# Patient Record
Sex: MALE | Race: OTHER | NOT HISPANIC OR LATINO | URBAN - METROPOLITAN AREA
[De-identification: names, ages, dates, MRNs, and addresses within clinical notes are randomized per-mention and may not be internally consistent; named-entity substitution may affect disease eponyms.]

---
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
## 2021-04-07 ENCOUNTER — APPOINTMENT (OUTPATIENT)
Dept: DERMATOLOGY | Facility: CLINIC | Age: 21
End: 2021-04-07
Payer: MEDICAID

## 2021-04-07 PROCEDURE — 99072 ADDL SUPL MATRL&STAF TM PHE: CPT

## 2021-04-07 PROCEDURE — 99214 OFFICE O/P EST MOD 30 MIN: CPT

## 2021-04-07 NOTE — HISTORY OF PRESENT ILLNESS
[FreeTextEntry1] : acne follow up  [de-identified] : Patient is an 20 yr old M presenting for follow up of acne noted for 2 years on the face- cheeks, forehead; upper back and shoulders. The acne is worse when he doesn't play sports and he tried ProActiv (BP) and Clarins in the past. He was last seen May 2020.\par \par Since his last visit, he reports that he has seen improvement with acne with wash and wipes. Denies any dryness. Denies picking. Denies any irritation. He has not been using the tretinoin gel. He is using the BPO wash in the shower and clindamycin swab [facility]. \par \par also with eczema on hands.

## 2021-04-07 NOTE — PHYSICAL EXAM
[Alert] : alert [Oriented x 3] : ~L oriented x 3 [Well Nourished] : well nourished [Conjunctiva Non-injected] : conjunctiva non-injected [No Visual Lymphadenopathy] : no visual  lymphadenopathy [No Clubbing] : no clubbing [No Edema] : no edema [No Bromhidrosis] : no bromhidrosis [No Chromhidrosis] : no chromhidrosis [FreeTextEntry3] : a few open and closed comedones on face and upper shoulders, few scars\par eczematous patch on hands

## 2021-04-07 NOTE — ASSESSMENT
[FreeTextEntry1] : Comedonal and inflammatory acne on face and shoulders:\par - Use of noncomedogenic and oil free moisturizers and sunscreens reinforced\par - c/w BPO wash in the shower\par - c/w Clinda  swab QAM\par - c/w Tretinoin 0.025% gel QHS, SED (irritation, dryness, potential flare, photosensitivity) - sent to [facility] and preferred pharmacy; Use Differin gel if unable to obtain Tretinoin.\par discussed areas of scarring; consider microneedling\par \par dyshydrotic eczema\par -education\par -gentle skin care reviewed\par -betamethasone ointment BID PRN; SED\par \par

## 2021-08-30 ENCOUNTER — RX RENEWAL (OUTPATIENT)
Age: 21
End: 2021-08-30

## 2021-10-09 ENCOUNTER — APPOINTMENT (OUTPATIENT)
Dept: DERMATOLOGY | Facility: CLINIC | Age: 21
End: 2021-10-09
Payer: MEDICAID

## 2021-10-09 DIAGNOSIS — L30.1 DYSHIDROSIS [POMPHOLYX]: ICD-10-CM

## 2021-10-09 PROCEDURE — 99214 OFFICE O/P EST MOD 30 MIN: CPT

## 2021-10-09 RX ORDER — TRETINOIN 0.25 MG/G
0.03 CREAM TOPICAL
Qty: 1 | Refills: 3 | Status: DISCONTINUED | COMMUNITY
Start: 2019-10-02 | End: 2021-10-09

## 2021-10-09 RX ORDER — TRETINOIN 0.25 MG/G
0.03 GEL TOPICAL
Qty: 1 | Refills: 11 | Status: DISCONTINUED | COMMUNITY
Start: 2019-06-27 | End: 2021-10-09

## 2021-10-09 RX ORDER — DOXYCYCLINE HYCLATE 100 MG/1
100 TABLET ORAL
Qty: 180 | Refills: 0 | Status: ACTIVE | COMMUNITY
Start: 2021-10-09 | End: 1900-01-01

## 2021-10-09 RX ORDER — CLINDAMYCIN PHOSPHATE 10 MG/ML
1 SOLUTION TOPICAL TWICE DAILY
Qty: 1 | Refills: 11 | Status: DISCONTINUED | COMMUNITY
Start: 2019-06-27 | End: 2021-10-09

## 2021-12-05 ENCOUNTER — RX RENEWAL (OUTPATIENT)
Age: 21
End: 2021-12-05

## 2021-12-15 ENCOUNTER — APPOINTMENT (OUTPATIENT)
Dept: PEDIATRIC ADOLESCENT MEDICINE | Facility: HOSPITAL | Age: 21
End: 2021-12-15
Payer: MEDICAID

## 2021-12-15 ENCOUNTER — OUTPATIENT (OUTPATIENT)
Dept: OUTPATIENT SERVICES | Age: 21
LOS: 1 days | End: 2021-12-15

## 2021-12-15 VITALS
DIASTOLIC BLOOD PRESSURE: 85 MMHG | SYSTOLIC BLOOD PRESSURE: 123 MMHG | HEIGHT: 70.25 IN | HEART RATE: 90 BPM | WEIGHT: 118.39 LBS | BODY MASS INDEX: 16.95 KG/M2

## 2021-12-15 DIAGNOSIS — Z23 ENCOUNTER FOR IMMUNIZATION: ICD-10-CM

## 2021-12-15 DIAGNOSIS — R63.6 UNDERWEIGHT: ICD-10-CM

## 2021-12-15 DIAGNOSIS — Z01.00 ENCOUNTER FOR EXAMINATION OF EYES AND VISION W/OUT ABNORMAL FINDINGS: ICD-10-CM

## 2021-12-15 PROCEDURE — 99395 PREV VISIT EST AGE 18-39: CPT | Mod: 25

## 2021-12-15 PROCEDURE — 90471 IMMUNIZATION ADMIN: CPT

## 2021-12-15 PROCEDURE — 90715 TDAP VACCINE 7 YRS/> IM: CPT

## 2021-12-15 PROCEDURE — 90620 MENB-4C VACCINE IM: CPT

## 2021-12-15 PROCEDURE — 90472 IMMUNIZATION ADMIN EACH ADD: CPT

## 2021-12-20 PROBLEM — Z23 NEED FOR VACCINATION: Status: ACTIVE | Noted: 2019-06-14

## 2021-12-20 LAB
25(OH)D3 SERPL-MCNC: 15.6 NG/ML
ALBUMIN SERPL ELPH-MCNC: 5 G/DL
ALP BLD-CCNC: 76 U/L
ALT SERPL-CCNC: 11 U/L
ANION GAP SERPL CALC-SCNC: 13 MMOL/L
AST SERPL-CCNC: 21 U/L
BASOPHILS # BLD AUTO: 0.05 K/UL
BASOPHILS NFR BLD AUTO: 0.5 %
BILIRUB SERPL-MCNC: 2.2 MG/DL
BUN SERPL-MCNC: 14 MG/DL
CALCIUM SERPL-MCNC: 10.3 MG/DL
CHLORIDE SERPL-SCNC: 101 MMOL/L
CHOLEST SERPL-MCNC: 178 MG/DL
CO2 SERPL-SCNC: 26 MMOL/L
CREAT SERPL-MCNC: 1.04 MG/DL
EOSINOPHIL # BLD AUTO: 0.02 K/UL
EOSINOPHIL NFR BLD AUTO: 0.2 %
GLUCOSE SERPL-MCNC: 78 MG/DL
HCT VFR BLD CALC: 49 %
HDLC SERPL-MCNC: 52 MG/DL
HGB BLD-MCNC: 16 G/DL
IMM GRANULOCYTES NFR BLD AUTO: 0.3 %
LDLC SERPL CALC-MCNC: 115 MG/DL
LYMPHOCYTES # BLD AUTO: 2.21 K/UL
LYMPHOCYTES NFR BLD AUTO: 23.4 %
M TB IFN-G BLD-IMP: NEGATIVE
MAN DIFF?: NORMAL
MCHC RBC-ENTMCNC: 29 PG
MCHC RBC-ENTMCNC: 32.7 GM/DL
MCV RBC AUTO: 88.8 FL
MEV IGG FLD QL IA: 78.6 AU/ML
MEV IGG+IGM SER-IMP: POSITIVE
MONOCYTES # BLD AUTO: 0.58 K/UL
MONOCYTES NFR BLD AUTO: 6.1 %
NEUTROPHILS # BLD AUTO: 6.57 K/UL
NEUTROPHILS NFR BLD AUTO: 69.5 %
NONHDLC SERPL-MCNC: 126 MG/DL
PLATELET # BLD AUTO: 260 K/UL
POTASSIUM SERPL-SCNC: 4.2 MMOL/L
PROT SERPL-MCNC: 8 G/DL
QUANTIFERON TB PLUS MITOGEN MINUS NIL: 8.31 IU/ML
QUANTIFERON TB PLUS NIL: 0.06 IU/ML
QUANTIFERON TB PLUS TB1 MINUS NIL: 0 IU/ML
QUANTIFERON TB PLUS TB2 MINUS NIL: -0.01 IU/ML
RBC # BLD: 5.52 M/UL
RBC # FLD: 12.6 %
RUBV IGG FLD-ACNC: 2 INDEX
RUBV IGG SER-IMP: POSITIVE
SODIUM SERPL-SCNC: 140 MMOL/L
TRIGL SERPL-MCNC: 53 MG/DL
TSH SERPL-ACNC: 1.31 UIU/ML
WBC # FLD AUTO: 9.46 K/UL

## 2021-12-20 NOTE — PHYSICAL EXAM

## 2021-12-20 NOTE — DISCUSSION/SUMMARY
[FreeTextEntry1] : Jam is a 22yo M with history of ARFID, underweight, acne here for annual PE and work forms.  \par \par Plan:\par - Lab: CBC, CMP, lipid, TSH, FT4, vit D. rubella, rubeola and Quant TB (work)\par - Vaccine today: Bexero#2, Tdap. Patient declined Fluzone\par - Continue with Dermatology for acne, eczema and recent hair thinning \par - Refer to Urology for seminal fluid leak, R/O prostatitis \par - Work forms to be completed and email to patient once labs are final\par - FU annually for PE
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
## 2021-12-29 ENCOUNTER — APPOINTMENT (OUTPATIENT)
Dept: PEDIATRICS | Facility: HOSPITAL | Age: 21
End: 2021-12-29

## 2022-01-08 ENCOUNTER — APPOINTMENT (OUTPATIENT)
Dept: DERMATOLOGY | Facility: CLINIC | Age: 22
End: 2022-01-08

## 2022-01-12 ENCOUNTER — RX RENEWAL (OUTPATIENT)
Age: 22
End: 2022-01-12

## 2022-02-08 ENCOUNTER — NON-APPOINTMENT (OUTPATIENT)
Age: 22
End: 2022-02-08

## 2022-02-08 ENCOUNTER — APPOINTMENT (OUTPATIENT)
Dept: OPHTHALMOLOGY | Facility: CLINIC | Age: 22
End: 2022-02-08
Payer: MEDICAID

## 2022-02-08 PROCEDURE — 92004 COMPRE OPH EXAM NEW PT 1/>: CPT

## 2022-02-08 PROCEDURE — 92015 DETERMINE REFRACTIVE STATE: CPT | Mod: NC

## 2022-02-18 ENCOUNTER — APPOINTMENT (OUTPATIENT)
Dept: UROLOGY | Facility: CLINIC | Age: 22
End: 2022-02-18
Payer: MEDICAID

## 2022-02-18 VITALS
SYSTOLIC BLOOD PRESSURE: 123 MMHG | BODY MASS INDEX: 17.22 KG/M2 | WEIGHT: 123 LBS | HEIGHT: 71 IN | HEART RATE: 91 BPM | DIASTOLIC BLOOD PRESSURE: 83 MMHG

## 2022-02-18 DIAGNOSIS — Z78.9 OTHER SPECIFIED HEALTH STATUS: ICD-10-CM

## 2022-02-18 DIAGNOSIS — N50.89 OTHER SPECIFIED DISORDERS OF THE MALE GENITAL ORGANS: ICD-10-CM

## 2022-02-18 PROCEDURE — 99203 OFFICE O/P NEW LOW 30 MIN: CPT

## 2022-02-18 NOTE — PHYSICAL EXAM
[General Appearance - Well Developed] : well developed [General Appearance - Well Nourished] : well nourished [Normal Appearance] : normal appearance [Well Groomed] : well groomed [General Appearance - In No Acute Distress] : no acute distress [Edema] : no peripheral edema [Respiration, Rhythm And Depth] : normal respiratory rhythm and effort [Exaggerated Use Of Accessory Muscles For Inspiration] : no accessory muscle use [Abdomen Soft] : soft [Abdomen Tenderness] : non-tender [Costovertebral Angle Tenderness] : no ~M costovertebral angle tenderness [Urethral Meatus] : meatus normal [Penis Abnormality] : normal circumcised penis [Scrotum] : the scrotum was normal [Urinary Bladder Findings] : the bladder was normal on palpation [Epididymis] : the epididymides were normal [Testes Tenderness] : no tenderness of the testes [Testes Mass (___cm)] : there were no testicular masses [Normal Station and Gait] : the gait and station were normal for the patient's age [] : no rash [No Focal Deficits] : no focal deficits [Oriented To Time, Place, And Person] : oriented to person, place, and time [Affect] : the affect was normal [Mood] : the mood was normal [Not Anxious] : not anxious [No Palpable Adenopathy] : no palpable adenopathy

## 2022-02-21 PROBLEM — N50.89 LEAKING SEMINAL FLUID: Status: ACTIVE | Noted: 2021-12-15

## 2022-02-21 NOTE — HISTORY OF PRESENT ILLNESS
[FreeTextEntry1] : 20 yo M presents with "near constant preseminal fluid leakage" starting a few months ago\par Before, it was only when aroused but notices it even not these days\par Not a constant stream but also seems to have a few drops at the meatus requiring use of toilet paper liner\par No blood, no pain\par Also feels like erections not as rigid for the last year\par Of note, pt does not self-stimulate at all due to [employer] reasons but just noticed that when he gets spontaneous erections, not as rigid\par Sometimes nocturnal emission can cause some pain as well\par Not sexually active\par no urinary issues

## 2022-02-21 NOTE — ASSESSMENT
[FreeTextEntry1] : 22 yo M with spontaneous leakage of seminal fluid, possible ED \par \par - Reviewed potential reasons for symptoms. Reassured pt that in most cases, this is completely normal. It also often resolved with increased sexual activity\par - Reviewed potential etiologies for a weaker erection. Again, discussed likely benign and once he is sexually active, may find that erections are normal. However, if pt is very concerned, discussed the pros and cons of a penile ultrasound. Pt is not ready to proceed with one at this time.\par - UA, culture, GC given possible urethral discharge\par - FU as needed

## 2022-02-22 LAB
APPEARANCE: CLEAR
BACTERIA UR CULT: NORMAL
BACTERIA: NEGATIVE
BILIRUBIN URINE: NEGATIVE
BLOOD URINE: NORMAL
C TRACH RRNA SPEC QL NAA+PROBE: NOT DETECTED
COLOR: YELLOW
GLUCOSE QUALITATIVE U: NEGATIVE
HYALINE CASTS: 0 /LPF
KETONES URINE: NEGATIVE
LEUKOCYTE ESTERASE URINE: NEGATIVE
MICROSCOPIC-UA: NORMAL
N GONORRHOEA RRNA SPEC QL NAA+PROBE: NOT DETECTED
NITRITE URINE: NEGATIVE
PH URINE: 7
PROTEIN URINE: NORMAL
RED BLOOD CELLS URINE: 7 /HPF
SOURCE AMPLIFICATION: NORMAL
SPECIFIC GRAVITY URINE: 1.02
SQUAMOUS EPITHELIAL CELLS: 1 /HPF
UROBILINOGEN URINE: NORMAL
WHITE BLOOD CELLS URINE: 1 /HPF

## 2022-02-23 ENCOUNTER — NON-APPOINTMENT (OUTPATIENT)
Age: 22
End: 2022-02-23

## 2022-03-02 ENCOUNTER — APPOINTMENT (OUTPATIENT)
Dept: UROLOGY | Facility: CLINIC | Age: 22
End: 2022-03-02
Payer: MEDICAID

## 2022-03-02 PROCEDURE — 76705 ECHO EXAM OF ABDOMEN: CPT

## 2022-04-05 ENCOUNTER — APPOINTMENT (OUTPATIENT)
Dept: INTERNAL MEDICINE | Facility: CLINIC | Age: 22
End: 2022-04-05
Payer: MEDICAID

## 2022-04-05 VITALS
BODY MASS INDEX: 16.89 KG/M2 | HEIGHT: 70 IN | DIASTOLIC BLOOD PRESSURE: 80 MMHG | SYSTOLIC BLOOD PRESSURE: 112 MMHG | HEART RATE: 85 BPM | TEMPERATURE: 97.8 F | OXYGEN SATURATION: 99 % | WEIGHT: 118 LBS

## 2022-04-05 PROCEDURE — 99385 PREV VISIT NEW AGE 18-39: CPT | Mod: 25

## 2022-04-05 PROCEDURE — G0444 DEPRESSION SCREEN ANNUAL: CPT | Mod: 59

## 2022-04-05 RX ORDER — BETAMETHASONE DIPROPIONATE 0.5 MG/G
0.05 OINTMENT TOPICAL TWICE DAILY
Qty: 1 | Refills: 0 | Status: DISCONTINUED | COMMUNITY
Start: 2021-04-07 | End: 2022-04-05

## 2022-04-05 RX ORDER — KETOCONAZOLE 20.5 MG/ML
2 SHAMPOO, SUSPENSION TOPICAL
Qty: 1 | Refills: 1 | Status: DISCONTINUED | COMMUNITY
Start: 2020-05-13 | End: 2022-04-05

## 2022-04-05 NOTE — PHYSICAL EXAM
[No Acute Distress] : no acute distress [Well Nourished] : well nourished [Well Developed] : well developed [Normal Sclera/Conjunctiva] : normal sclera/conjunctiva [PERRL] : pupils equal round and reactive to light [EOMI] : extraocular movements intact [Normal Outer Ear/Nose] : the outer ears and nose were normal in appearance [No JVD] : no jugular venous distention [No Lymphadenopathy] : no lymphadenopathy [Supple] : supple [No Respiratory Distress] : no respiratory distress  [No Accessory Muscle Use] : no accessory muscle use [Clear to Auscultation] : lungs were clear to auscultation bilaterally [Normal Rate] : normal rate  [Regular Rhythm] : with a regular rhythm [Normal S1, S2] : normal S1 and S2 [No Edema] : there was no peripheral edema [No Extremity Clubbing/Cyanosis] : no extremity clubbing/cyanosis [Soft] : abdomen soft [Non Tender] : non-tender [Non-distended] : non-distended [Normal Bowel Sounds] : normal bowel sounds [Normal Posterior Cervical Nodes] : no posterior cervical lymphadenopathy [Normal Anterior Cervical Nodes] : no anterior cervical lymphadenopathy [No CVA Tenderness] : no CVA  tenderness [No Spinal Tenderness] : no spinal tenderness [No Joint Swelling] : no joint swelling [Grossly Normal Strength/Tone] : grossly normal strength/tone [No Rash] : no rash [Coordination Grossly Intact] : coordination grossly intact [No Focal Deficits] : no focal deficits [Normal Gait] : normal gait [Normal Affect] : the affect was normal [Normal Insight/Judgement] : insight and judgment were intact [Comprehensive Foot Exam Normal] : Right and left foot were examined and both feet are normal. No ulcers in either foot. Toes are normal and with full ROM.  Normal tactile sensation with monofilament testing throughout both feet
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
## 2022-04-05 NOTE — ASSESSMENT
[FreeTextEntry1] : 1) HM Encouraged healthy meals and snacks, and  physical activity as tolerated for weight maintenance. UTD with vaccine. Lab ordered as below  Orders and referral provided as below.\par \par All patient questions answered today and understood by patient. Patient to follow up if new symptoms, questions, renewals or health concerns.

## 2022-04-05 NOTE — HEALTH RISK ASSESSMENT
[Very Good] : ~his/her~  mood as very good [Never] : Never [No] : In the past 12 months have you used drugs other than those required for medical reasons? No [No falls in past year] : Patient reported no falls in the past year [0] : 2) Feeling down, depressed, or hopeless: Not at all (0) [PHQ-2 Negative - No further assessment needed] : PHQ-2 Negative - No further assessment needed [de-identified] : walking [de-identified] : balanced [[id number]] : 0 [None] : None [Fully functional (bathing, dressing, toileting, transferring, walking, feeding)] : Fully functional (bathing, dressing, toileting, transferring, walking, feeding) [Fully functional (using the telephone, shopping, preparing meals, housekeeping, doing laundry, using] : Fully functional and needs no help or supervision to perform IADLs (using the telephone, shopping, preparing meals, housekeeping, doing laundry, using transportation, managing medications and managing finances) [Smoke Detector] : smoke detector [Seat Belt] :  uses seat belt [Patient/Caregiver not ready to engage] : , patient/caregiver not ready to engage

## 2022-04-06 ENCOUNTER — TRANSCRIPTION ENCOUNTER (OUTPATIENT)
Age: 22
End: 2022-04-06

## 2022-04-06 ENCOUNTER — NON-APPOINTMENT (OUTPATIENT)
Age: 22
End: 2022-04-06

## 2022-04-06 LAB
ALBUMIN SERPL ELPH-MCNC: 4.9 G/DL
ALP BLD-CCNC: 71 U/L
ALT SERPL-CCNC: 107 U/L
ANION GAP SERPL CALC-SCNC: 11 MMOL/L
APPEARANCE: CLEAR
AST SERPL-CCNC: 253 U/L
BACTERIA: NEGATIVE
BASOPHILS # BLD AUTO: 0.03 K/UL
BASOPHILS NFR BLD AUTO: 0.7 %
BILIRUB SERPL-MCNC: 1.4 MG/DL
BILIRUBIN URINE: NEGATIVE
BLOOD URINE: NEGATIVE
BUN SERPL-MCNC: 14 MG/DL
CALCIUM SERPL-MCNC: 9.9 MG/DL
CHLORIDE SERPL-SCNC: 102 MMOL/L
CHOLEST SERPL-MCNC: 147 MG/DL
CK SERPL-CCNC: ABNORMAL U/L
CO2 SERPL-SCNC: 26 MMOL/L
COLOR: NORMAL
CREAT SERPL-MCNC: 0.92 MG/DL
CRP SERPL-MCNC: <3 MG/L
EGFR: 122 ML/MIN/1.73M2
EOSINOPHIL # BLD AUTO: 0.07 K/UL
EOSINOPHIL NFR BLD AUTO: 1.6 %
GLUCOSE QUALITATIVE U: NEGATIVE
GLUCOSE SERPL-MCNC: 93 MG/DL
HBV CORE IGG+IGM SER QL: NONREACTIVE
HBV SURFACE AB SERPL IA-ACNC: 156.9 MIU/ML
HBV SURFACE AG SER QL: NONREACTIVE
HCT VFR BLD CALC: 45.6 %
HCV AB SER QL: NONREACTIVE
HCV S/CO RATIO: 0.2 S/CO
HDLC SERPL-MCNC: 45 MG/DL
HGB BLD-MCNC: 15 G/DL
HYALINE CASTS: 0 /LPF
IMM GRANULOCYTES NFR BLD AUTO: 0.2 %
KETONES URINE: NEGATIVE
LDLC SERPL CALC-MCNC: 91 MG/DL
LEUKOCYTE ESTERASE URINE: NEGATIVE
LYMPHOCYTES # BLD AUTO: 2.05 K/UL
LYMPHOCYTES NFR BLD AUTO: 47.8 %
MAN DIFF?: NORMAL
MCHC RBC-ENTMCNC: 29.2 PG
MCHC RBC-ENTMCNC: 32.9 GM/DL
MCV RBC AUTO: 88.9 FL
MICROSCOPIC-UA: NORMAL
MONOCYTES # BLD AUTO: 0.33 K/UL
MONOCYTES NFR BLD AUTO: 7.7 %
NEUTROPHILS # BLD AUTO: 1.8 K/UL
NEUTROPHILS NFR BLD AUTO: 42 %
NITRITE URINE: NEGATIVE
NONHDLC SERPL-MCNC: 102 MG/DL
PH URINE: 6.5
PLATELET # BLD AUTO: 240 K/UL
POTASSIUM SERPL-SCNC: 4.8 MMOL/L
PROT SERPL-MCNC: 7.5 G/DL
PROTEIN URINE: NEGATIVE
RBC # BLD: 5.13 M/UL
RBC # FLD: 12.3 %
RED BLOOD CELLS URINE: 1 /HPF
SODIUM SERPL-SCNC: 139 MMOL/L
SPECIFIC GRAVITY URINE: 1.02
SQUAMOUS EPITHELIAL CELLS: 0 /HPF
TRIGL SERPL-MCNC: 52 MG/DL
TSH SERPL-ACNC: 1.2 UIU/ML
UROBILINOGEN URINE: NORMAL
VIT B12 SERPL-MCNC: 589 PG/ML
WBC # FLD AUTO: 4.29 K/UL
WHITE BLOOD CELLS URINE: 0 /HPF

## 2022-04-07 ENCOUNTER — NON-APPOINTMENT (OUTPATIENT)
Age: 22
End: 2022-04-07

## 2022-04-07 ENCOUNTER — INPATIENT (INPATIENT)
Facility: HOSPITAL | Age: 22
LOS: 0 days | Discharge: HOME | DRG: 558 | End: 2022-04-08
Attending: HOSPITALIST | Admitting: HOSPITALIST
Payer: MEDICAID

## 2022-04-07 VITALS
WEIGHT: 119.93 LBS | RESPIRATION RATE: 20 BRPM | SYSTOLIC BLOOD PRESSURE: 127 MMHG | OXYGEN SATURATION: 98 % | HEIGHT: 70 IN | DIASTOLIC BLOOD PRESSURE: 80 MMHG | HEART RATE: 83 BPM | TEMPERATURE: 98 F

## 2022-04-07 DIAGNOSIS — R74.8 ABNORMAL LEVELS OF OTHER SERUM ENZYMES: ICD-10-CM

## 2022-04-07 DIAGNOSIS — M62.82 RHABDOMYOLYSIS: ICD-10-CM

## 2022-04-07 DIAGNOSIS — Z29.9 ENCOUNTER FOR PROPHYLACTIC MEASURES, UNSPECIFIED: ICD-10-CM

## 2022-04-07 DIAGNOSIS — R74.01 ELEVATION OF LEVELS OF LIVER TRANSAMINASE LEVELS: ICD-10-CM

## 2022-04-07 DIAGNOSIS — Z78.9 OTHER SPECIFIED HEALTH STATUS: Chronic | ICD-10-CM

## 2022-04-07 LAB
ALBUMIN SERPL ELPH-MCNC: 4.1 G/DL — SIGNIFICANT CHANGE UP (ref 3.3–5)
ALBUMIN SERPL ELPH-MCNC: 4.8 G/DL — SIGNIFICANT CHANGE UP (ref 3.3–5)
ALP SERPL-CCNC: 62 U/L — SIGNIFICANT CHANGE UP (ref 40–120)
ALP SERPL-CCNC: 74 U/L — SIGNIFICANT CHANGE UP (ref 40–120)
ALT FLD-CCNC: 66 U/L — HIGH (ref 10–45)
ALT FLD-CCNC: 81 U/L — HIGH (ref 10–45)
ANION GAP SERPL CALC-SCNC: 10 MMOL/L — SIGNIFICANT CHANGE UP (ref 5–17)
ANION GAP SERPL CALC-SCNC: 11 MMOL/L — SIGNIFICANT CHANGE UP (ref 5–17)
ANION GAP SERPL CALC-SCNC: 12 MMOL/L — SIGNIFICANT CHANGE UP (ref 5–17)
APPEARANCE UR: CLEAR — SIGNIFICANT CHANGE UP
APTT BLD: 36.7 SEC — HIGH (ref 27.5–35.5)
AST SERPL-CCNC: 119 U/L — HIGH (ref 10–40)
AST SERPL-CCNC: 83 U/L — HIGH (ref 10–40)
BACTERIA # UR AUTO: NEGATIVE — SIGNIFICANT CHANGE UP
BASE EXCESS BLDV CALC-SCNC: 4.1 MMOL/L — HIGH (ref -2–2)
BASOPHILS # BLD AUTO: 0.05 K/UL — SIGNIFICANT CHANGE UP (ref 0–0.2)
BASOPHILS NFR BLD AUTO: 0.8 % — SIGNIFICANT CHANGE UP (ref 0–2)
BILIRUB SERPL-MCNC: 1.1 MG/DL — SIGNIFICANT CHANGE UP (ref 0.2–1.2)
BILIRUB SERPL-MCNC: 1.5 MG/DL — HIGH (ref 0.2–1.2)
BILIRUB UR-MCNC: NEGATIVE — SIGNIFICANT CHANGE UP
BUN SERPL-MCNC: 10 MG/DL — SIGNIFICANT CHANGE UP (ref 7–23)
BUN SERPL-MCNC: 11 MG/DL — SIGNIFICANT CHANGE UP (ref 7–23)
BUN SERPL-MCNC: 14 MG/DL — SIGNIFICANT CHANGE UP (ref 7–23)
CA-I SERPL-SCNC: 1.25 MMOL/L — SIGNIFICANT CHANGE UP (ref 1.15–1.33)
CALCIUM SERPL-MCNC: 8.7 MG/DL — SIGNIFICANT CHANGE UP (ref 8.4–10.5)
CALCIUM SERPL-MCNC: 8.9 MG/DL — SIGNIFICANT CHANGE UP (ref 8.4–10.5)
CALCIUM SERPL-MCNC: 9.8 MG/DL — SIGNIFICANT CHANGE UP (ref 8.4–10.5)
CHLORIDE BLDV-SCNC: 101 MMOL/L — SIGNIFICANT CHANGE UP (ref 96–108)
CHLORIDE SERPL-SCNC: 102 MMOL/L — SIGNIFICANT CHANGE UP (ref 96–108)
CHLORIDE SERPL-SCNC: 106 MMOL/L — SIGNIFICANT CHANGE UP (ref 96–108)
CHLORIDE SERPL-SCNC: 106 MMOL/L — SIGNIFICANT CHANGE UP (ref 96–108)
CK SERPL-CCNC: 4624 U/L — HIGH (ref 30–200)
CK SERPL-CCNC: 4889 U/L — HIGH (ref 30–200)
CK SERPL-CCNC: 6495 U/L — HIGH (ref 30–200)
CO2 BLDV-SCNC: 34 MMOL/L — HIGH (ref 22–26)
CO2 SERPL-SCNC: 26 MMOL/L — SIGNIFICANT CHANGE UP (ref 22–31)
CO2 SERPL-SCNC: 27 MMOL/L — SIGNIFICANT CHANGE UP (ref 22–31)
CO2 SERPL-SCNC: 27 MMOL/L — SIGNIFICANT CHANGE UP (ref 22–31)
COLOR SPEC: YELLOW — SIGNIFICANT CHANGE UP
CREAT SERPL-MCNC: 0.85 MG/DL — SIGNIFICANT CHANGE UP (ref 0.5–1.3)
CREAT SERPL-MCNC: 0.9 MG/DL — SIGNIFICANT CHANGE UP (ref 0.5–1.3)
CREAT SERPL-MCNC: 0.97 MG/DL — SIGNIFICANT CHANGE UP (ref 0.5–1.3)
DIFF PNL FLD: NEGATIVE — SIGNIFICANT CHANGE UP
EGFR: 115 ML/MIN/1.73M2 — SIGNIFICANT CHANGE UP
EGFR: 125 ML/MIN/1.73M2 — SIGNIFICANT CHANGE UP
EGFR: 128 ML/MIN/1.73M2 — SIGNIFICANT CHANGE UP
EOSINOPHIL # BLD AUTO: 0.08 K/UL — SIGNIFICANT CHANGE UP (ref 0–0.5)
EOSINOPHIL NFR BLD AUTO: 1.3 % — SIGNIFICANT CHANGE UP (ref 0–6)
EPI CELLS # UR: 0 /HPF — SIGNIFICANT CHANGE UP
ERYTHROCYTE [SEDIMENTATION RATE] IN BLOOD: 3 MM/HR — SIGNIFICANT CHANGE UP (ref 0–15)
ETHANOL SERPL-MCNC: SIGNIFICANT CHANGE UP MG/DL (ref 0–10)
GAS PNL BLDV: 138 MMOL/L — SIGNIFICANT CHANGE UP (ref 136–145)
GAS PNL BLDV: SIGNIFICANT CHANGE UP
GAS PNL BLDV: SIGNIFICANT CHANGE UP
GLUCOSE BLDV-MCNC: 90 MG/DL — SIGNIFICANT CHANGE UP (ref 70–99)
GLUCOSE SERPL-MCNC: 127 MG/DL — HIGH (ref 70–99)
GLUCOSE SERPL-MCNC: 87 MG/DL — SIGNIFICANT CHANGE UP (ref 70–99)
GLUCOSE SERPL-MCNC: 92 MG/DL — SIGNIFICANT CHANGE UP (ref 70–99)
GLUCOSE UR QL: NEGATIVE — SIGNIFICANT CHANGE UP
HCO3 BLDV-SCNC: 32 MMOL/L — HIGH (ref 22–29)
HCT VFR BLD CALC: 44 % — SIGNIFICANT CHANGE UP (ref 39–50)
HCT VFR BLDA CALC: 47 % — SIGNIFICANT CHANGE UP (ref 39–51)
HGB BLD CALC-MCNC: 15.6 G/DL — SIGNIFICANT CHANGE UP (ref 12.6–17.4)
HGB BLD-MCNC: 15.1 G/DL — SIGNIFICANT CHANGE UP (ref 13–17)
HYALINE CASTS # UR AUTO: 0 /LPF — SIGNIFICANT CHANGE UP (ref 0–2)
IMM GRANULOCYTES NFR BLD AUTO: 0.2 % — SIGNIFICANT CHANGE UP (ref 0–1.5)
INR BLD: 1.12 RATIO — SIGNIFICANT CHANGE UP (ref 0.88–1.16)
KETONES UR-MCNC: NEGATIVE — SIGNIFICANT CHANGE UP
LACTATE BLDV-MCNC: 1 MMOL/L — SIGNIFICANT CHANGE UP (ref 0.7–2)
LEUKOCYTE ESTERASE UR-ACNC: NEGATIVE — SIGNIFICANT CHANGE UP
LYMPHOCYTES # BLD AUTO: 3 K/UL — SIGNIFICANT CHANGE UP (ref 1–3.3)
LYMPHOCYTES # BLD AUTO: 50 % — HIGH (ref 13–44)
MCHC RBC-ENTMCNC: 29.3 PG — SIGNIFICANT CHANGE UP (ref 27–34)
MCHC RBC-ENTMCNC: 34.3 GM/DL — SIGNIFICANT CHANGE UP (ref 32–36)
MCV RBC AUTO: 85.4 FL — SIGNIFICANT CHANGE UP (ref 80–100)
MONOCYTES # BLD AUTO: 0.43 K/UL — SIGNIFICANT CHANGE UP (ref 0–0.9)
MONOCYTES NFR BLD AUTO: 7.2 % — SIGNIFICANT CHANGE UP (ref 2–14)
NEUTROPHILS # BLD AUTO: 2.43 K/UL — SIGNIFICANT CHANGE UP (ref 1.8–7.4)
NEUTROPHILS NFR BLD AUTO: 40.5 % — LOW (ref 43–77)
NITRITE UR-MCNC: NEGATIVE — SIGNIFICANT CHANGE UP
NRBC # BLD: 0 /100 WBCS — SIGNIFICANT CHANGE UP (ref 0–0)
PCO2 BLDV: 59 MMHG — HIGH (ref 42–55)
PCP SPEC-MCNC: SIGNIFICANT CHANGE UP
PH BLDV: 7.34 — SIGNIFICANT CHANGE UP (ref 7.32–7.43)
PH UR: 7 — SIGNIFICANT CHANGE UP (ref 5–8)
PLATELET # BLD AUTO: 215 K/UL — SIGNIFICANT CHANGE UP (ref 150–400)
PO2 BLDV: 25 MMHG — SIGNIFICANT CHANGE UP (ref 25–45)
POTASSIUM BLDV-SCNC: 3.8 MMOL/L — SIGNIFICANT CHANGE UP (ref 3.5–5.1)
POTASSIUM SERPL-MCNC: 3.5 MMOL/L — SIGNIFICANT CHANGE UP (ref 3.5–5.3)
POTASSIUM SERPL-MCNC: 3.8 MMOL/L — SIGNIFICANT CHANGE UP (ref 3.5–5.3)
POTASSIUM SERPL-MCNC: 4.1 MMOL/L — SIGNIFICANT CHANGE UP (ref 3.5–5.3)
POTASSIUM SERPL-SCNC: 3.5 MMOL/L — SIGNIFICANT CHANGE UP (ref 3.5–5.3)
POTASSIUM SERPL-SCNC: 3.8 MMOL/L — SIGNIFICANT CHANGE UP (ref 3.5–5.3)
POTASSIUM SERPL-SCNC: 4.1 MMOL/L — SIGNIFICANT CHANGE UP (ref 3.5–5.3)
PROT SERPL-MCNC: 6.4 G/DL — SIGNIFICANT CHANGE UP (ref 6–8.3)
PROT SERPL-MCNC: 7.2 G/DL — SIGNIFICANT CHANGE UP (ref 6–8.3)
PROT UR-MCNC: NEGATIVE — SIGNIFICANT CHANGE UP
PROTHROM AB SERPL-ACNC: 12.9 SEC — SIGNIFICANT CHANGE UP (ref 10.5–13.4)
RBC # BLD: 5.15 M/UL — SIGNIFICANT CHANGE UP (ref 4.2–5.8)
RBC # FLD: 11.9 % — SIGNIFICANT CHANGE UP (ref 10.3–14.5)
RBC CASTS # UR COMP ASSIST: 2 /HPF — SIGNIFICANT CHANGE UP (ref 0–4)
SAO2 % BLDV: 34.4 % — LOW (ref 67–88)
SARS-COV-2 RNA SPEC QL NAA+PROBE: SIGNIFICANT CHANGE UP
SODIUM SERPL-SCNC: 141 MMOL/L — SIGNIFICANT CHANGE UP (ref 135–145)
SODIUM SERPL-SCNC: 142 MMOL/L — SIGNIFICANT CHANGE UP (ref 135–145)
SODIUM SERPL-SCNC: 144 MMOL/L — SIGNIFICANT CHANGE UP (ref 135–145)
SP GR SPEC: 1.02 — SIGNIFICANT CHANGE UP (ref 1.01–1.02)
TSH SERPL-MCNC: 2.34 UIU/ML — SIGNIFICANT CHANGE UP (ref 0.27–4.2)
TSH SERPL-MCNC: 2.43 UIU/ML — SIGNIFICANT CHANGE UP (ref 0.27–4.2)
UROBILINOGEN FLD QL: NEGATIVE — SIGNIFICANT CHANGE UP
WBC # BLD: 6 K/UL — SIGNIFICANT CHANGE UP (ref 3.8–10.5)
WBC # FLD AUTO: 6 K/UL — SIGNIFICANT CHANGE UP (ref 3.8–10.5)
WBC UR QL: 1 /HPF — SIGNIFICANT CHANGE UP (ref 0–5)

## 2022-04-07 PROCEDURE — 12345: CPT | Mod: NC,GC

## 2022-04-07 PROCEDURE — 93010 ELECTROCARDIOGRAM REPORT: CPT

## 2022-04-07 PROCEDURE — 99285 EMERGENCY DEPT VISIT HI MDM: CPT

## 2022-04-07 PROCEDURE — 99223 1ST HOSP IP/OBS HIGH 75: CPT

## 2022-04-07 RX ORDER — SODIUM CHLORIDE 9 MG/ML
1000 INJECTION INTRAMUSCULAR; INTRAVENOUS; SUBCUTANEOUS ONCE
Refills: 0 | Status: COMPLETED | OUTPATIENT
Start: 2022-04-07 | End: 2022-04-07

## 2022-04-07 RX ORDER — LANOLIN ALCOHOL/MO/W.PET/CERES
3 CREAM (GRAM) TOPICAL AT BEDTIME
Refills: 0 | Status: DISCONTINUED | OUTPATIENT
Start: 2022-04-07 | End: 2022-04-08

## 2022-04-07 RX ORDER — ACETAMINOPHEN 500 MG
650 TABLET ORAL EVERY 6 HOURS
Refills: 0 | Status: DISCONTINUED | OUTPATIENT
Start: 2022-04-07 | End: 2022-04-08

## 2022-04-07 RX ORDER — POTASSIUM CHLORIDE 20 MEQ
40 PACKET (EA) ORAL ONCE
Refills: 0 | Status: COMPLETED | OUTPATIENT
Start: 2022-04-07 | End: 2022-04-07

## 2022-04-07 RX ORDER — ONDANSETRON 8 MG/1
4 TABLET, FILM COATED ORAL EVERY 8 HOURS
Refills: 0 | Status: DISCONTINUED | OUTPATIENT
Start: 2022-04-07 | End: 2022-04-08

## 2022-04-07 RX ORDER — SODIUM CHLORIDE 9 MG/ML
1000 INJECTION, SOLUTION INTRAVENOUS
Refills: 0 | Status: DISCONTINUED | OUTPATIENT
Start: 2022-04-07 | End: 2022-04-08

## 2022-04-07 RX ORDER — SODIUM CHLORIDE 9 MG/ML
1000 INJECTION, SOLUTION INTRAVENOUS
Refills: 0 | Status: DISCONTINUED | OUTPATIENT
Start: 2022-04-07 | End: 2022-04-07

## 2022-04-07 RX ADMIN — Medication 650 MILLIGRAM(S): at 10:02

## 2022-04-07 RX ADMIN — Medication 40 MILLIEQUIVALENT(S): at 10:09

## 2022-04-07 RX ADMIN — SODIUM CHLORIDE 1000 MILLILITER(S): 9 INJECTION INTRAMUSCULAR; INTRAVENOUS; SUBCUTANEOUS at 02:48

## 2022-04-07 RX ADMIN — SODIUM CHLORIDE 200 MILLILITER(S): 9 INJECTION, SOLUTION INTRAVENOUS at 20:39

## 2022-04-07 RX ADMIN — SODIUM CHLORIDE 200 MILLILITER(S): 9 INJECTION, SOLUTION INTRAVENOUS at 17:37

## 2022-04-07 RX ADMIN — Medication 650 MILLIGRAM(S): at 11:11

## 2022-04-07 RX ADMIN — SODIUM CHLORIDE 1000 MILLILITER(S): 9 INJECTION INTRAMUSCULAR; INTRAVENOUS; SUBCUTANEOUS at 05:37

## 2022-04-07 RX ADMIN — SODIUM CHLORIDE 200 MILLILITER(S): 9 INJECTION, SOLUTION INTRAVENOUS at 10:02

## 2022-04-07 NOTE — H&P ADULT - NSHPLABSRESULTS_GEN_ALL_CORE
15.1   6.00  )-----------( 215      ( 2022 02:46 )             44.0       04-07    141  |  102  |  14  ----------------------------<  92  4.1   |  27  |  0.97    Ca    9.8      2022 02:46  Phos  4.1     04-07  Mg     2.0     04-07    TPro  7.2  /  Alb  4.8  /  TBili  1.5<H>  /  DBili  x   /  AST  119<H>  /  ALT  81<H>  /  AlkPhos  74  04-07      CARDIAC MARKERS ( 2022 02:46 )  x     / x     / 6495 U/L / x     / x            LIVER FUNCTIONS - ( 2022 02:46 )  Alb: 4.8 g/dL / Pro: 7.2 g/dL / ALK PHOS: 74 U/L / ALT: 81 U/L / AST: 119 U/L / GGT: x             PT/INR - ( 2022 02:46 )   PT: 12.9 sec;   INR: 1.12 ratio         PTT - ( 2022 02:46 )  PTT:36.7 sec    Urinalysis Basic - ( 2022 02:55 )    Color: Yellow / Appearance: Clear / S.022 / pH: x  Gluc: x / Ketone: Negative  / Bili: Negative / Urobili: Negative   Blood: x / Protein: Negative / Nitrite: Negative   Leuk Esterase: Negative / RBC: 2 /hpf / WBC 1 /HPF   Sq Epi: x / Non Sq Epi: 0 /hpf / Bacteria: Negative        I have personally reviewed EKG  I have personally reviewed imaging

## 2022-04-07 NOTE — PROGRESS NOTE ADULT - PROBLEM SELECTOR PLAN 3
-Diet: regular  -DVT ppx: encourage ambulation -Diet: regular  -DVT ppx: encourage ambulation  DIspo: likely home tomorrow if CK continues to downtrend

## 2022-04-07 NOTE — ED PROVIDER NOTE - OBJECTIVE STATEMENT
21M no PMH p/w elevated CPK to 20,000 and elevated LFTs on outpatient labs yesterday. Pt reports myriad of symptoms over past year, prompting PCP evaluation. Symptoms include decreased appetite, intermittent nausea, migraine (had extensive neuro w/u including normal MRI, was diagnosed with tension HA), feelings of depression. Denies SI. Was found to have the abnormal labs as above yesterday, and sent to ED. Pt denies fevers, muscle weakness/pain, urinary sx, abd pain, vomiting. No recent excessive exercise. No recent illness. States he only takes Vitamin D & L-arginine supplements (for a while). Denies alcohol/recreational drug use.    Pt reports slight chest heaviness upon ED arrival lasting minutes, resolved without intervention, currently without CP. No f/c, SOB, leg swelling/pain, dizziness, LOC. 21M no PMH p/w elevated CPK to 20,000 and elevated LFTs on outpatient labs yesterday. Pt reports myriad of symptoms over past year, prompting PCP evaluation. Symptoms include decreased appetite, intermittent nausea, migraine (had extensive neuro w/u including normal MRI, was diagnosed with tension HA), feelings of depression. Denies SI. Was found to have the abnormal labs as above yesterday, and sent to ED. Pt denies fevers, muscle weakness/pain, urinary sx, abd pain, vomiting. No recent excessive exercise. No recent illness. States he only takes Vitamin D & L-arginine supplements (for a while). Denies alcohol/recreational drug use.    Pt reports slight chest heaviness upon ED arrival lasting minutes, resolved without intervention, currently without CP. No f/c, SOB, leg swelling/pain, dizziness, LOC.    PCP Dr. Daniel Watt

## 2022-04-07 NOTE — PROGRESS NOTE ADULT - PROBLEM SELECTOR PLAN 1
-suspect rhabo but unclear what the cause is at this time as pt denies physical exertion and from history low suspicion for myopathies or infection  -outpt [id number] now trended down to 6000s on presentation  -c/w IVF  -trend CK  -will obtain aldolase, TSH, ESR  -for his numerous complaints; can consider pan-scan -suspect rhabo but unclear what the cause is at this time as pt denies physical exertion and from history low suspicion for myopathies or infection. last exercise over 1 week ago  -outpt [id number] now trended down to 4000s   -c/w IVF  -trend CK daily now  -will obtain aldolase, TSH, ESR

## 2022-04-07 NOTE — ED ADULT NURSE NOTE - OBJECTIVE STATEMENT
20 y/o male coming to the ER with c/o abnormal labs. A&Ox4. Ambulatory. No PMH. Patient states he was seen by his PCP yesterday where they julieta lab work and they called him around 2100 on 4/6/2022 and told him to go to the ER due to an elevated CPK to 20,000 as well as elevated LFT's. Patient states he has had multiple medical complaints over the last year including decreased appetite, intermittent nausea, migraine, he was dx with tension headaches. Patient states he has no current symptoms. Patient endorses no alcohol or drug use. No SI/HI. Abdomen is soft, non distended, non tender. Safety measures maintained. Bed in the lowest position. Provider at the bedside. No acute distress noted or further complaints at this time.

## 2022-04-07 NOTE — PATIENT PROFILE ADULT - LEGAL HELP
How Severe Are Your Spot(S)?: mild
Have Your Spot(S) Been Treated In The Past?: has not been treated
Hpi Title: Evaluation of Skin Lesions
Additional History: Pt states the lesion by her left eye has been LN2 x2. The one on her nose has been LN2 x1.
Year Removed: [address]
no

## 2022-04-07 NOTE — ED PROVIDER NOTE - CLINICAL SUMMARY MEDICAL DECISION MAKING FREE TEXT BOX
Joe, PGY3 - 21M p/w elevated CPK/LFTs on outpatient labs yesterday. Will eval for rhabdo, although pt not endorsing symptoms c/w rhabdo. VSS, well-appearing, no e/o trauma or compartment syndrome on exam. oJe, PGY3 - 21M p/w elevated CPK/LFTs on outpatient labs yesterday. Will eval for rhabdo, although pt not endorsing symptoms c/w rhabdo. VSS, well-appearing, no e/o trauma or compartment syndrome on exam.    DIAMOND Hicks, Attending: seen with resident and reviewed note.    Healthy male presenting at guidance of doctor for abnormal clinic labs with transaminitis and CPK to ?20,000. However denies any muscle complaints, injuries, changes in workout, joint issues. Felt well tonight and would have not come to ED if not being called. Normal blood work in Dec 2021. Denies any etoh, drug use, mushroom use, excessive OTC med/supplement use. Has had some appetite issues but no weight loss or abd pain. Overall feels well.    Non toxic. Abd soft. No tight/firm compartments. Moving all extremities and thorax comfortably.    Plan to recheck LFTs/CPK. Given minimal symptomology may be OK for outpatient f/u depending on results. Does not fit clinically with rhabdo or compartment syndrome. Not jaundiced. No stigmata of liver failure.

## 2022-04-07 NOTE — H&P ADULT - NSICDXFAMILYHX_GEN_ALL_CORE_FT
FAMILY HISTORY:  Father  Still living? Unknown  FH: diabetes mellitus, Age at diagnosis: Age Unknown  FH: HTN (hypertension), Age at diagnosis: Age Unknown    Mother  Still living? Unknown  Family history of kidney stone, Age at diagnosis: Age Unknown

## 2022-04-07 NOTE — H&P ADULT - NSHPPHYSICALEXAM_GEN_ALL_CORE
Vital Signs Last 24 Hrs  T(C): 36.4 (07 Apr 2022 05:55), Max: 36.7 (07 Apr 2022 00:12)  T(F): 97.5 (07 Apr 2022 05:55), Max: 98.1 (07 Apr 2022 00:12)  HR: 76 (07 Apr 2022 05:55) (76 - 83)  BP: 102/69 (07 Apr 2022 05:55) (102/69 - 127/80)  BP(mean): --  RR: 17 (07 Apr 2022 05:55) (16 - 20)  SpO2: 100% (07 Apr 2022 05:55) (98% - 100%)

## 2022-04-07 NOTE — H&P ADULT - PROBLEM SELECTOR PLAN 2
-likely from skeletal muscle   -trending down along with CK  -monitor with CMP -likely from skeletal muscle   -trending down along with CK  -monitor LFTs

## 2022-04-07 NOTE — H&P ADULT - HISTORY OF PRESENT ILLNESS
21M no PMH sent in by PCP for elevated CK. Pt had presented to his PCP with a myriad of sxs including, HA, nausea, dizziness, blurry vision, TIRSO, loss of smell and chronic fatigue. For the HA, he had an extensive w/u done including MRI and was dx'd with tension HA. Labs revealed elevated CK to 92222n with elevated LFTs and pt was advised to go to the ED. He is currently fasting for Ramadan and reports that prior to testing, he had only eaten dates. He also has not worked out in about a week and reports it was a light work out. Denied physical over-exertion. He also denied muscle weakness, difficulty climbing stairs or getting up from a chair, fevers, myalgias, ETOH use/recreational drug use, CP, SOB    ED course: afebrile. HDS. Given IVF.

## 2022-04-07 NOTE — ED CLERICAL - BED REQUESTED
Patient to call in 2 to 4 weeks and make a determination if her respiratory status has worsened and requires thoracentesis   07-Apr-2022 04:51

## 2022-04-07 NOTE — ED ADULT TRIAGE NOTE - CHIEF COMPLAINT QUOTE
elevated cpk and liver enzymes on outpatient labs. c/o slight chest heaviness elevated cpk to 20,000 and elevated liver enzymes on outpatient labs. c/o slight chest heaviness

## 2022-04-07 NOTE — H&P ADULT - PROBLEM SELECTOR PLAN 1
-suspect rhabo but unclear what the cause is at this time as pt denies physical exertion and from history low suspicion for myopathies or infection  -outpt [id number] now trended down to 6000s on presentation  -c/w IVF  -trend CK  -will obtain aldolase, TSH, ESR  -for his numerous complaints; can consider pan-scan

## 2022-04-07 NOTE — ED PROVIDER NOTE - PHYSICAL EXAMINATION
I have reviewed the triage vital signs.  Const: AAOx3, in NAD  Eyes: no conjunctival injection  HENT: NCAT, Neck supple, oral mucosa moist  CV: RRR, +S1, S2  Resp: CTAB, no respiratory distress  GI: Abdomen soft, NTND, no guarding  : no CVA tenderness  Extremities: Nontender. No peripheral edema. UE/LE BL compartments soft  Skin: Warm, well perfused, no rash  MSK: No gross deformities appreciated  Neuro: No focal sensory or motor deficits  Psych: Appropriate mood and affect

## 2022-04-07 NOTE — ED PROVIDER NOTE - PROGRESS NOTE DETAILS
Joe, PGY3 - Elevated CPK but downtrending from yesterday. Pt reevaluated, continues to feel well. Updated on results/plan for admission. No CDU beds available at this time. Endorsed to hospitalist

## 2022-04-07 NOTE — PROGRESS NOTE ADULT - SUBJECTIVE AND OBJECTIVE BOX
Gail Norman, PGY-3  Internal Medicine    PROGRESS NOTE:     Patient is a 21y old  Male who presents with a chief complaint of elevated CK (2022 07:18)      SUBJECTIVE / OVERNIGHT EVENTS:    ADDITIONAL REVIEW OF SYSTEMS:    MEDICATIONS  (STANDING):    MEDICATIONS  (PRN):  acetaminophen     Tablet .. 650 milliGRAM(s) Oral every 6 hours PRN Temp greater or equal to 38C (100.4F), Mild Pain (1 - 3)  aluminum hydroxide/magnesium hydroxide/simethicone Suspension 30 milliLiter(s) Oral every 4 hours PRN Dyspepsia  melatonin 3 milliGRAM(s) Oral at bedtime PRN Insomnia  ondansetron Injectable 4 milliGRAM(s) IV Push every 8 hours PRN Nausea and/or Vomiting      CAPILLARY BLOOD GLUCOSE        I&O's Summary      PHYSICAL EXAM:  Vital Signs Last 24 Hrs  T(C): 36.4 (2022 05:55), Max: 36.7 (2022 00:12)  T(F): 97.5 (2022 05:55), Max: 98.1 (2022 00:12)  HR: 76 (2022 05:55) (76 - 83)  BP: 102/69 (2022 05:55) (102/69 - 127/80)  BP(mean): --  RR: 17 (2022 05:55) (16 - 20)  SpO2: 100% (2022 05:55) (98% - 100%)    CONSTITUTIONAL: NAD, well-developed  RESPIRATORY: Normal respiratory effort; lungs are clear to auscultation bilaterally  CARDIOVASCULAR: Regular rate and rhythm, normal S1 and S2, no murmur/rub/gallop; No lower extremity edema; Peripheral pulses are 2+ bilaterally  ABDOMEN: Nontender to palpation, normoactive bowel sounds, no rebound/guarding; No hepatosplenomegaly  MUSCLOSKELETAL: no clubbing or cyanosis of digits; no joint swelling or tenderness to palpation  PSYCH: A+O to person, place, and time; affect appropriate    LABS:                        15.1   6.00  )-----------( 215      ( 2022 02:46 )             44.0     04-07    142  |  106  |  11  ----------------------------<  127<H>  3.5   |  26  |  0.90    Ca    8.7      2022 06:56  Phos  4.1     04-07  Mg     2.0     04-07    TPro  7.2  /  Alb  4.8  /  TBili  1.5<H>  /  DBili  x   /  AST  119<H>  /  ALT  81<H>  /  AlkPhos  74  04-07    PT/INR - ( 2022 02:46 )   PT: 12.9 sec;   INR: 1.12 ratio         PTT - ( 2022 02:46 )  PTT:36.7 sec  CARDIAC MARKERS ( 2022 02:46 )  x     / x     / 6495 U/L / x     / x          Urinalysis Basic - ( 2022 02:55 )    Color: Yellow / Appearance: Clear / S.022 / pH: x  Gluc: x / Ketone: Negative  / Bili: Negative / Urobili: Negative   Blood: x / Protein: Negative / Nitrite: Negative   Leuk Esterase: Negative / RBC: 2 /hpf / WBC 1 /HPF   Sq Epi: x / Non Sq Epi: 0 /hpf / Bacteria: Negative          RADIOLOGY & ADDITIONAL TESTS:  Results Reviewed:   Imaging Personally Reviewed:  Electrocardiogram Personally Reviewed:    COORDINATION OF CARE:  Care Discussed with Consultants/Other Providers [Y/N]:  Prior or Outpatient Records Reviewed [Y/N]:   Gail Norman, PGY-3  Internal Medicine    PROGRESS NOTE:     Patient is a 21y old  Male who presents with a chief complaint of elevated CK (2022 07:18)      SUBJECTIVE / OVERNIGHT EVENTS: No acute events since admission. Patient seen and evaluated this AM, no complaints, states he feels well. He notes that he last exercised 8 days ago (weight lifting at the gym) and noticed his L arm become slightly swollen the day after which self-resolved. Denies myoglobinuria.     ADDITIONAL REVIEW OF SYSTEMS: neg     MEDICATIONS  (STANDING):    MEDICATIONS  (PRN):  acetaminophen     Tablet .. 650 milliGRAM(s) Oral every 6 hours PRN Temp greater or equal to 38C (100.4F), Mild Pain (1 - 3)  aluminum hydroxide/magnesium hydroxide/simethicone Suspension 30 milliLiter(s) Oral every 4 hours PRN Dyspepsia  melatonin 3 milliGRAM(s) Oral at bedtime PRN Insomnia  ondansetron Injectable 4 milliGRAM(s) IV Push every 8 hours PRN Nausea and/or Vomiting      CAPILLARY BLOOD GLUCOSE        I&O's Summary      PHYSICAL EXAM:  Vital Signs Last 24 Hrs  T(C): 36.4 (2022 05:55), Max: 36.7 (2022 00:12)  T(F): 97.5 (2022 05:55), Max: 98.1 (2022 00:12)  HR: 76 (2022 05:55) (76 - 83)  BP: 102/69 (2022 05:55) (102/69 - 127/80)  BP(mean): --  RR: 17 (2022 05:55) (16 - 20)  SpO2: 100% (2022 05:55) (98% - 100%)    CONSTITUTIONAL: NAD, well-developed  RESPIRATORY: Normal respiratory effort; lungs are clear to auscultation bilaterally  CARDIOVASCULAR: Regular rate and rhythm, normal S1 and S2, no murmur/rub/gallop; No lower extremity edema; Peripheral pulses are 2+ bilaterally  ABDOMEN: Nontender to palpation, normoactive bowel sounds, no rebound/guarding; No hepatosplenomegaly  MUSCLOSKELETAL: no clubbing or cyanosis of digits; no joint swelling or tenderness to palpation  PSYCH: A+O to person, place, and time; affect appropriate    LABS:                        15.1   6.00  )-----------( 215      ( 2022 02:46 )             44.0     04-07    142  |  106  |  11  ----------------------------<  127<H>  3.5   |  26  |  0.90    Ca    8.7      2022 06:56  Phos  4.1     04-07  Mg     2.0     04-07    TPro  7.2  /  Alb  4.8  /  TBili  1.5<H>  /  DBili  x   /  AST  119<H>  /  ALT  81<H>  /  AlkPhos  74  04-07    PT/INR - ( 2022 02:46 )   PT: 12.9 sec;   INR: 1.12 ratio         PTT - ( 2022 02:46 )  PTT:36.7 sec  CARDIAC MARKERS ( 2022 02:46 )  x     / x     / 6495 U/L / x     / x          Urinalysis Basic - ( 2022 02:55 )    Color: Yellow / Appearance: Clear / S.022 / pH: x  Gluc: x / Ketone: Negative  / Bili: Negative / Urobili: Negative   Blood: x / Protein: Negative / Nitrite: Negative   Leuk Esterase: Negative / RBC: 2 /hpf / WBC 1 /HPF   Sq Epi: x / Non Sq Epi: 0 /hpf / Bacteria: Negative          RADIOLOGY & ADDITIONAL TESTS:  Results Reviewed:   Imaging Personally Reviewed:  Electrocardiogram Personally Reviewed:    COORDINATION OF CARE:  Care Discussed with Consultants/Other Providers [Y/N]:  Prior or Outpatient Records Reviewed [Y/N]:

## 2022-04-08 ENCOUNTER — TRANSCRIPTION ENCOUNTER (OUTPATIENT)
Age: 22
End: 2022-04-08

## 2022-04-08 VITALS
OXYGEN SATURATION: 99 % | DIASTOLIC BLOOD PRESSURE: 84 MMHG | HEART RATE: 83 BPM | RESPIRATION RATE: 17 BRPM | SYSTOLIC BLOOD PRESSURE: 122 MMHG | TEMPERATURE: 98 F

## 2022-04-08 LAB
ALBUMIN SERPL ELPH-MCNC: 3.9 G/DL — SIGNIFICANT CHANGE UP (ref 3.3–5)
ALDOLASE SERPL-CCNC: 70.8 U/L — HIGH (ref 3.3–10.3)
ALP SERPL-CCNC: 58 U/L — SIGNIFICANT CHANGE UP (ref 40–120)
ALT FLD-CCNC: 53 U/L — HIGH (ref 10–45)
ANION GAP SERPL CALC-SCNC: 12 MMOL/L — SIGNIFICANT CHANGE UP (ref 5–17)
AST SERPL-CCNC: 56 U/L — HIGH (ref 10–40)
BASOPHILS # BLD AUTO: 0.03 K/UL — SIGNIFICANT CHANGE UP (ref 0–0.2)
BASOPHILS NFR BLD AUTO: 0.6 % — SIGNIFICANT CHANGE UP (ref 0–2)
BILIRUB SERPL-MCNC: 1 MG/DL — SIGNIFICANT CHANGE UP (ref 0.2–1.2)
BUN SERPL-MCNC: 6 MG/DL — LOW (ref 7–23)
CALCIUM SERPL-MCNC: 9.2 MG/DL — SIGNIFICANT CHANGE UP (ref 8.4–10.5)
CHLORIDE SERPL-SCNC: 105 MMOL/L — SIGNIFICANT CHANGE UP (ref 96–108)
CK SERPL-CCNC: 2530 U/L — HIGH (ref 30–200)
CO2 SERPL-SCNC: 24 MMOL/L — SIGNIFICANT CHANGE UP (ref 22–31)
CREAT SERPL-MCNC: 0.76 MG/DL — SIGNIFICANT CHANGE UP (ref 0.5–1.3)
EGFR: 132 ML/MIN/1.73M2 — SIGNIFICANT CHANGE UP
EOSINOPHIL # BLD AUTO: 0.14 K/UL — SIGNIFICANT CHANGE UP (ref 0–0.5)
EOSINOPHIL NFR BLD AUTO: 2.7 % — SIGNIFICANT CHANGE UP (ref 0–6)
GLUCOSE SERPL-MCNC: 90 MG/DL — SIGNIFICANT CHANGE UP (ref 70–99)
HCT VFR BLD CALC: 39 % — SIGNIFICANT CHANGE UP (ref 39–50)
HGB BLD-MCNC: 13.3 G/DL — SIGNIFICANT CHANGE UP (ref 13–17)
IMM GRANULOCYTES NFR BLD AUTO: 0.2 % — SIGNIFICANT CHANGE UP (ref 0–1.5)
LYMPHOCYTES # BLD AUTO: 2.7 K/UL — SIGNIFICANT CHANGE UP (ref 1–3.3)
LYMPHOCYTES # BLD AUTO: 52 % — HIGH (ref 13–44)
MAGNESIUM SERPL-MCNC: 1.7 MG/DL — SIGNIFICANT CHANGE UP (ref 1.6–2.6)
MCHC RBC-ENTMCNC: 29.4 PG — SIGNIFICANT CHANGE UP (ref 27–34)
MCHC RBC-ENTMCNC: 34.1 GM/DL — SIGNIFICANT CHANGE UP (ref 32–36)
MCV RBC AUTO: 86.3 FL — SIGNIFICANT CHANGE UP (ref 80–100)
MONOCYTES # BLD AUTO: 0.38 K/UL — SIGNIFICANT CHANGE UP (ref 0–0.9)
MONOCYTES NFR BLD AUTO: 7.3 % — SIGNIFICANT CHANGE UP (ref 2–14)
NEUTROPHILS # BLD AUTO: 1.93 K/UL — SIGNIFICANT CHANGE UP (ref 1.8–7.4)
NEUTROPHILS NFR BLD AUTO: 37.2 % — LOW (ref 43–77)
NRBC # BLD: 0 /100 WBCS — SIGNIFICANT CHANGE UP (ref 0–0)
PHOSPHATE SERPL-MCNC: 3.6 MG/DL — SIGNIFICANT CHANGE UP (ref 2.5–4.5)
PLATELET # BLD AUTO: 187 K/UL — SIGNIFICANT CHANGE UP (ref 150–400)
POTASSIUM SERPL-MCNC: 3.9 MMOL/L — SIGNIFICANT CHANGE UP (ref 3.5–5.3)
POTASSIUM SERPL-SCNC: 3.9 MMOL/L — SIGNIFICANT CHANGE UP (ref 3.5–5.3)
PROT SERPL-MCNC: 6.1 G/DL — SIGNIFICANT CHANGE UP (ref 6–8.3)
RBC # BLD: 4.52 M/UL — SIGNIFICANT CHANGE UP (ref 4.2–5.8)
RBC # FLD: 12 % — SIGNIFICANT CHANGE UP (ref 10.3–14.5)
SODIUM SERPL-SCNC: 141 MMOL/L — SIGNIFICANT CHANGE UP (ref 135–145)
WBC # BLD: 5.19 K/UL — SIGNIFICANT CHANGE UP (ref 3.8–10.5)
WBC # FLD AUTO: 5.19 K/UL — SIGNIFICANT CHANGE UP (ref 3.8–10.5)

## 2022-04-08 PROCEDURE — 83605 ASSAY OF LACTIC ACID: CPT

## 2022-04-08 PROCEDURE — 84100 ASSAY OF PHOSPHORUS: CPT

## 2022-04-08 PROCEDURE — 93005 ELECTROCARDIOGRAM TRACING: CPT

## 2022-04-08 PROCEDURE — 99238 HOSP IP/OBS DSCHRG MGMT 30/<: CPT | Mod: GC

## 2022-04-08 PROCEDURE — 80048 BASIC METABOLIC PNL TOTAL CA: CPT

## 2022-04-08 PROCEDURE — 99285 EMERGENCY DEPT VISIT HI MDM: CPT | Mod: 25

## 2022-04-08 PROCEDURE — 87635 SARS-COV-2 COVID-19 AMP PRB: CPT

## 2022-04-08 PROCEDURE — 84132 ASSAY OF SERUM POTASSIUM: CPT

## 2022-04-08 PROCEDURE — 82085 ASSAY OF ALDOLASE: CPT

## 2022-04-08 PROCEDURE — 81001 URINALYSIS AUTO W/SCOPE: CPT

## 2022-04-08 PROCEDURE — 82435 ASSAY OF BLOOD CHLORIDE: CPT

## 2022-04-08 PROCEDURE — 84443 ASSAY THYROID STIM HORMONE: CPT

## 2022-04-08 PROCEDURE — 84295 ASSAY OF SERUM SODIUM: CPT

## 2022-04-08 PROCEDURE — 80053 COMPREHEN METABOLIC PANEL: CPT

## 2022-04-08 PROCEDURE — 36415 COLL VENOUS BLD VENIPUNCTURE: CPT

## 2022-04-08 PROCEDURE — 82550 ASSAY OF CK (CPK): CPT

## 2022-04-08 PROCEDURE — 85730 THROMBOPLASTIN TIME PARTIAL: CPT

## 2022-04-08 PROCEDURE — 82947 ASSAY GLUCOSE BLOOD QUANT: CPT

## 2022-04-08 PROCEDURE — 83735 ASSAY OF MAGNESIUM: CPT

## 2022-04-08 PROCEDURE — 85610 PROTHROMBIN TIME: CPT

## 2022-04-08 PROCEDURE — 80307 DRUG TEST PRSMV CHEM ANLYZR: CPT

## 2022-04-08 PROCEDURE — 85014 HEMATOCRIT: CPT

## 2022-04-08 PROCEDURE — 85652 RBC SED RATE AUTOMATED: CPT

## 2022-04-08 PROCEDURE — 82565 ASSAY OF CREATININE: CPT

## 2022-04-08 PROCEDURE — 85025 COMPLETE CBC W/AUTO DIFF WBC: CPT

## 2022-04-08 PROCEDURE — 84550 ASSAY OF BLOOD/URIC ACID: CPT

## 2022-04-08 PROCEDURE — 82330 ASSAY OF CALCIUM: CPT

## 2022-04-08 PROCEDURE — 82803 BLOOD GASES ANY COMBINATION: CPT

## 2022-04-08 PROCEDURE — 85018 HEMOGLOBIN: CPT

## 2022-04-08 NOTE — PROGRESS NOTE ADULT - PROBLEM SELECTOR PLAN 2
-likely from skeletal muscle   -trending down along with CK  -monitor LFTs
-likely from skeletal muscle   -trending down along with CK  -monitor LFTs

## 2022-04-08 NOTE — PROGRESS NOTE ADULT - PROBLEM SELECTOR PLAN 3
-Diet: regular  -DVT ppx: encourage ambulation  DIspo: likely home tomorrow if CK continues to downtrend -Diet: regular  -DVT ppx: encourage ambulation  DIspo: [facility]

## 2022-04-08 NOTE — PROGRESS NOTE ADULT - SUBJECTIVE AND OBJECTIVE BOX
Gail Norman, PGY-3  Internal Medicine    PROGRESS NOTE:     Patient is a 21y old  Male who presents with a chief complaint of elevated CK (2022 08:01)      SUBJECTIVE / OVERNIGHT EVENTS: No acute events overnight    ADDITIONAL REVIEW OF SYSTEMS:    MEDICATIONS  (STANDING):  lactated ringers. 1000 milliLiter(s) (200 mL/Hr) IV Continuous <Continuous>    MEDICATIONS  (PRN):  acetaminophen     Tablet .. 650 milliGRAM(s) Oral every 6 hours PRN Temp greater or equal to 38C (100.4F), Mild Pain (1 - 3)  aluminum hydroxide/magnesium hydroxide/simethicone Suspension 30 milliLiter(s) Oral every 4 hours PRN Dyspepsia  melatonin 3 milliGRAM(s) Oral at bedtime PRN Insomnia  ondansetron Injectable 4 milliGRAM(s) IV Push every 8 hours PRN Nausea and/or Vomiting      CAPILLARY BLOOD GLUCOSE        I&O's Summary    2022 07:01  -  2022 07:00  --------------------------------------------------------  IN: 240 mL / OUT: 0 mL / NET: 240 mL        PHYSICAL EXAM:  Vital Signs Last 24 Hrs  T(C): 36.4 (2022 04:39), Max: 36.9 (2022 12:13)  T(F): 97.5 (2022 04:39), Max: 98.4 (2022 12:13)  HR: 71 (2022 04:39) (71 - 90)  BP: 114/71 (2022 04:39) (108/72 - 115/78)  BP(mean): --  RR: 18 (2022 04:39) (16 - 18)  SpO2: 99% (2022 04:39) (98% - 100%)    CONSTITUTIONAL: NAD, well-developed  RESPIRATORY: Normal respiratory effort; lungs are clear to auscultation bilaterally  CARDIOVASCULAR: Regular rate and rhythm, normal S1 and S2, no murmur/rub/gallop; No lower extremity edema; Peripheral pulses are 2+ bilaterally  ABDOMEN: Nontender to palpation, normoactive bowel sounds, no rebound/guarding; No hepatosplenomegaly  MUSCLOSKELETAL: no clubbing or cyanosis of digits; no joint swelling or tenderness to palpation  PSYCH: A+O to person, place, and time; affect appropriate    LABS:                        13.3   5.19  )-----------( 187      ( 2022 07:10 )             39.0     04-08    141  |  105  |  6<L>  ----------------------------<  90  3.9   |  24  |  0.76    Ca    9.2      2022 07:08  Phos  3.6     04-08  Mg     1.7     04-08    TPro  6.1  /  Alb  3.9  /  TBili  1.0  /  DBili  x   /  AST  56<H>  /  ALT  53<H>  /  AlkPhos  58  04-08    PT/INR - ( 2022 02:46 )   PT: 12.9 sec;   INR: 1.12 ratio         PTT - ( 2022 02:46 )  PTT:36.7 sec  CARDIAC MARKERS ( 2022 10:16 )  x     / x     / 4624 U/L / x     / x      CARDIAC MARKERS ( 2022 06:56 )  x     / x     / 4889 U/L / x     / x      CARDIAC MARKERS ( 2022 02:46 )  x     / x     / 6495 U/L / x     / x          Urinalysis Basic - ( 2022 02:55 )    Color: Yellow / Appearance: Clear / S.022 / pH: x  Gluc: x / Ketone: Negative  / Bili: Negative / Urobili: Negative   Blood: x / Protein: Negative / Nitrite: Negative   Leuk Esterase: Negative / RBC: 2 /hpf / WBC 1 /HPF   Sq Epi: x / Non Sq Epi: 0 /hpf / Bacteria: Negative          RADIOLOGY & ADDITIONAL TESTS:  Results Reviewed:   Imaging Personally Reviewed:  Electrocardiogram Personally Reviewed:    COORDINATION OF CARE:  Care Discussed with Consultants/Other Providers [Y/N]:  Prior or Outpatient Records Reviewed [Y/N]:   Gail Norman, PGY-3  Internal Medicine    PROGRESS NOTE:     Patient is a 21y old  Male who presents with a chief complaint of elevated CK (2022 08:01)      SUBJECTIVE / OVERNIGHT EVENTS: No acute events overnight. Patient seen and examined this AM, no complaints, feels well, no muscle weakness or swelling. Patient feels well enough to go home, discussed aggressive PO hydration and how patient should hold off on fasting through the weekend. Emphasized importance of increasing exercise gradually in the future and to increase PO water intake.     ADDITIONAL REVIEW OF SYSTEMS: neg     MEDICATIONS  (STANDING):  lactated ringers. 1000 milliLiter(s) (200 mL/Hr) IV Continuous <Continuous>    MEDICATIONS  (PRN):  acetaminophen     Tablet .. 650 milliGRAM(s) Oral every 6 hours PRN Temp greater or equal to 38C (100.4F), Mild Pain (1 - 3)  aluminum hydroxide/magnesium hydroxide/simethicone Suspension 30 milliLiter(s) Oral every 4 hours PRN Dyspepsia  melatonin 3 milliGRAM(s) Oral at bedtime PRN Insomnia  ondansetron Injectable 4 milliGRAM(s) IV Push every 8 hours PRN Nausea and/or Vomiting      CAPILLARY BLOOD GLUCOSE        I&O's Summary    2022 07:01  -  2022 07:00  --------------------------------------------------------  IN: 240 mL / OUT: 0 mL / NET: 240 mL        PHYSICAL EXAM:  Vital Signs Last 24 Hrs  T(C): 36.4 (2022 04:39), Max: 36.9 (2022 12:13)  T(F): 97.5 (2022 04:39), Max: 98.4 (2022 12:13)  HR: 71 (2022 04:39) (71 - 90)  BP: 114/71 (2022 04:39) (108/72 - 115/78)  BP(mean): --  RR: 18 (2022 04:39) (16 - 18)  SpO2: 99% (2022 04:39) (98% - 100%)    CONSTITUTIONAL: NAD, well-developed  RESPIRATORY: Normal respiratory effort; lungs are clear to auscultation bilaterally  CARDIOVASCULAR: Regular rate and rhythm, normal S1 and S2, no murmur/rub/gallop; No lower extremity edema; Peripheral pulses are 2+ bilaterally  ABDOMEN: Nontender to palpation, normoactive bowel sounds, no rebound/guarding; No hepatosplenomegaly  MUSCLOSKELETAL: no clubbing or cyanosis of digits; no joint swelling or tenderness to palpation  PSYCH: A+O to person, place, and time; affect appropriate    LABS:                        13.3   5.19  )-----------( 187      ( 2022 07:10 )             39.0     04-08    141  |  105  |  6<L>  ----------------------------<  90  3.9   |  24  |  0.76    Ca    9.2      2022 07:08  Phos  3.6     04-08  Mg     1.7     04-08    TPro  6.1  /  Alb  3.9  /  TBili  1.0  /  DBili  x   /  AST  56<H>  /  ALT  53<H>  /  AlkPhos  58  04-08    PT/INR - ( 2022 02:46 )   PT: 12.9 sec;   INR: 1.12 ratio         PTT - ( 2022 02:46 )  PTT:36.7 sec  CARDIAC MARKERS ( 2022 10:16 )  x     / x     / 4624 U/L / x     / x      CARDIAC MARKERS ( 2022 06:56 )  x     / x     / 4889 U/L / x     / x      CARDIAC MARKERS ( 2022 02:46 )  x     / x     / 6495 U/L / x     / x          Urinalysis Basic - ( 2022 02:55 )    Color: Yellow / Appearance: Clear / S.022 / pH: x  Gluc: x / Ketone: Negative  / Bili: Negative / Urobili: Negative   Blood: x / Protein: Negative / Nitrite: Negative   Leuk Esterase: Negative / RBC: 2 /hpf / WBC 1 /HPF   Sq Epi: x / Non Sq Epi: 0 /hpf / Bacteria: Negative          RADIOLOGY & ADDITIONAL TESTS:  Results Reviewed:   Imaging Personally Reviewed:  Electrocardiogram Personally Reviewed:    COORDINATION OF CARE:  Care Discussed with Consultants/Other Providers [Y/N]:  Prior or Outpatient Records Reviewed [Y/N]:

## 2022-04-08 NOTE — DISCHARGE NOTE PROVIDER - NSDCCPCAREPLAN_GEN_ALL_CORE_FT
PRINCIPAL DISCHARGE DIAGNOSIS  Diagnosis: Rhabdomyolysis  Assessment and Plan of Treatment: You were sent to the hospital for very high Creatine Kinase (CK) levels which happens after muscle cells are damaged. This can sometimes harm your kidneys, however in your situation your kidney numbers stayed in the normal range the whole time which means you had no kidney damage. You received hydration through the IV which helped flush this protein out of your system. At your doctor's office your CK was around 26,000 which decreased to 2,530 on the the day of discharge from the hospital. Please continue to drink large amounts of water for the next several days to continue to clear this protein from your blood. After this has resolved, please try to hydrate well every day and drink more on days that you exercise. You can continue to exercise however please increase your exercise gradually in the future. Please refrain from fasting this weekend since you need to drink large amounts of volume. Once your numbers are in the normal range, you may resume fasting. Please follow up with your primary care physician to repeat blood work early next week to make sure your numbers have come back into the normal range. If you feel muscle pains, notice red urine, or have other concerning symptoms, please call your primary care physician or return to the Emergency Department.

## 2022-04-08 NOTE — DISCHARGE NOTE NURSING/CASE MANAGEMENT/SOCIAL WORK - NSDCPEFALRISK_GEN_ALL_CORE
For information on Fall & Injury Prevention, visit: [email or web address] OR  [email or web address] OR  https://www.cdc.gov/steadi/patient.html

## 2022-04-08 NOTE — DISCHARGE NOTE PROVIDER - CARE PROVIDER_API CALL
Daniel Hendrickson)  Internal Medicine  [address], [address]  [place] [address]  Phone: [phone]  Fax: [phone]  Follow Up Time: 1-3 days

## 2022-04-08 NOTE — PROGRESS NOTE ADULT - ASSESSMENT
21M no PMH sent in by PCP for elevated CK and transaminases.
21M no PMH sent in by PCP for elevated CK and transaminases.

## 2022-04-08 NOTE — PROGRESS NOTE ADULT - PROBLEM SELECTOR PLAN 1
-suspect rhabo but unclear what the cause is at this time as pt denies physical exertion and from history low suspicion for myopathies or infection. last exercise over 1 week ago  -outpt [id number] now trended down to 4000s   -c/w IVF  -trend CK daily now  -will obtain aldolase, TSH, ESR -suspect rhabo but unclear what the cause is at this time as pt denies physical exertion and from history low suspicion for myopathies or infection. last exercise over 1 week ago  -outpt [id number] now trended down to approx 2500  -discontinue IVF and encourage aggressive PO hydration  -aldolase pending, TSH wnl

## 2022-04-08 NOTE — PROGRESS NOTE ADULT - ATTENDING COMMENTS
This is a 21M no PMH sent in by PCP for elevated CK and transaminases. He has been doing vigorous exercise last week, started fasting for Ramadan. Denies fever, drug abuse, ETOH, seizure. CK 23k (outpatient) to 4.6K now with IV hydration.    - Possible traumatic Rhabdomyolysis due to vigorous exercise recently. No more muscle pain    Feels lot better.   - Urine tox neg, CK 4624, Scr- wnl and mildly elevated transaminase due to Rhabdo( consistent with muscle release of AST and ALT) , not liver disease  - c/w IV hydration, d/c planning tomorrow
This is a 21M no PMH sent in by PCP for elevated CK and transaminases. He has been doing vigorous exercise last week, started fasting for Ramadan. Denies fever, drug abuse, ETOH, seizure. CK 23k (outpatient) to 2.5K now with IV hydration.    - Possible traumatic Rhabdomyolysis due to vigorous exercise recently. No more muscle pain or compartment syndrome    Feels lot better.   - Urine tox neg, CK 2530, Scr- wnl and mildly elevated transaminase due to Rhabdo( consistent with muscle release of AST and ALT) , not liver disease  - will d/c today, advised to hydrate himself with lots of water. Outpatient f/u with PCP    d/c time 42 min.  ** spoke to his Father

## 2022-04-08 NOTE — DISCHARGE NOTE NURSING/CASE MANAGEMENT/SOCIAL WORK - PATIENT PORTAL LINK FT
You can access the FollowMyHealth Patient Portal offered by [facility] by registering at the following website: [email or web address]. By joining [facility]’s FollowMyHealth portal, you will also be able to view your health information using other applications (apps) compatible with our system.

## 2022-04-08 NOTE — DISCHARGE NOTE PROVIDER - NSDCFUADDAPPT_GEN_ALL_CORE_FT
Please follow up with your primary care physician to repeat your bloodwork in several days to make sure your CK levels have decreased back to normal levels.

## 2022-04-08 NOTE — DISCHARGE NOTE PROVIDER - HOSPITAL COURSE
21 M w/ no PMHx was sent in by PCP for elevated CPK and admitted to medicine for aggressive IV hydration in setting of rhabdomyolysis. In the ED, patient was given 2L of IVF boluses and then started on LR @ 200 cc for another 24 hours. His CK downtrended from 26k outpatient down to 2530 on day of discharge. Patient denied myoglobinuria and UA was negative. Patient was feeling well and was discharged home with instructions to continue to aggressively hydrate for the next several days with outpatient follow up.

## 2022-04-11 ENCOUNTER — NON-APPOINTMENT (OUTPATIENT)
Age: 22
End: 2022-04-11

## 2022-04-11 ENCOUNTER — LABORATORY RESULT (OUTPATIENT)
Age: 22
End: 2022-04-11

## 2022-04-11 ENCOUNTER — APPOINTMENT (OUTPATIENT)
Dept: INTERNAL MEDICINE | Facility: CLINIC | Age: 22
End: 2022-04-11
Payer: MEDICAID

## 2022-04-11 VITALS
TEMPERATURE: 97.3 F | DIASTOLIC BLOOD PRESSURE: 70 MMHG | HEART RATE: 90 BPM | SYSTOLIC BLOOD PRESSURE: 118 MMHG | OXYGEN SATURATION: 97 %

## 2022-04-11 DIAGNOSIS — R31.29 OTHER MICROSCOPIC HEMATURIA: ICD-10-CM

## 2022-04-11 DIAGNOSIS — Z00.00 ENCOUNTER FOR GENERAL ADULT MEDICAL EXAMINATION W/OUT ABNORMAL FINDINGS: ICD-10-CM

## 2022-04-11 DIAGNOSIS — R06.02 SHORTNESS OF BREATH: ICD-10-CM

## 2022-04-11 PROCEDURE — 99214 OFFICE O/P EST MOD 30 MIN: CPT | Mod: 25

## 2022-04-11 NOTE — PHYSICAL EXAM
[No Acute Distress] : no acute distress [Well Nourished] : well nourished [Well Developed] : well developed [Normal Sclera/Conjunctiva] : normal sclera/conjunctiva [EOMI] : extraocular movements intact [Normal Outer Ear/Nose] : the outer ears and nose were normal in appearance [Normal TMs] : both tympanic membranes were normal [No JVD] : no jugular venous distention [Supple] : supple [No Respiratory Distress] : no respiratory distress  [Clear to Auscultation] : lungs were clear to auscultation bilaterally [Normal Rate] : normal rate  [Regular Rhythm] : with a regular rhythm [Normal S1, S2] : normal S1 and S2 [No Murmur] : no murmur heard [No Edema] : there was no peripheral edema

## 2022-04-11 NOTE — HISTORY OF PRESENT ILLNESS
[FreeTextEntry1] : Follow up  [de-identified] : Mr. SONNY CHAPIN is a 21 year old man with pmhx of acne, headaches who presents for a follow up. He was recently found with elevated ck level to 22k. Was admitted received IV hydration. Last 2k. He complains of headaches, generalized weakness and fatigue. Discussed past blood work. Normal crp. His MRI of the head was declined. 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

## 2022-04-12 ENCOUNTER — APPOINTMENT (OUTPATIENT)
Dept: PEDIATRICS | Facility: HOSPITAL | Age: 22
End: 2022-04-12

## 2022-04-12 ENCOUNTER — TRANSCRIPTION ENCOUNTER (OUTPATIENT)
Age: 22
End: 2022-04-12

## 2022-04-13 ENCOUNTER — TRANSCRIPTION ENCOUNTER (OUTPATIENT)
Age: 22
End: 2022-04-13

## 2022-04-13 LAB
ALBUMIN SERPL ELPH-MCNC: 5.2 G/DL
ALP BLD-CCNC: 74 U/L
ALT SERPL-CCNC: 71 U/L
ANA PAT FLD IF-IMP: ABNORMAL
ANA SER IF-ACNC: ABNORMAL
ANION GAP SERPL CALC-SCNC: 14 MMOL/L
AST SERPL-CCNC: 50 U/L
BILIRUB SERPL-MCNC: 1.8 MG/DL
BUN SERPL-MCNC: 13 MG/DL
CALCIUM SERPL-MCNC: 10 MG/DL
CHLORIDE SERPL-SCNC: 100 MMOL/L
CK SERPL-CCNC: 494 U/L
CO2 SERPL-SCNC: 26 MMOL/L
CREAT SERPL-MCNC: 1.04 MG/DL
EGFR: 105 ML/MIN/1.73M2
GLUCOSE SERPL-MCNC: 71 MG/DL
MITOCHONDRIA AB SER IF-ACNC: NORMAL
NT-PROBNP SERPL-MCNC: <5 PG/ML
POTASSIUM SERPL-SCNC: 4.2 MMOL/L
PROT SERPL-MCNC: 7.7 G/DL
SMOOTH MUSCLE AB SER QL IF: NORMAL
SODIUM SERPL-SCNC: 140 MMOL/L
TROPONIN-T, HIGH SENSITIVITY: 17 NG/L

## 2022-04-14 ENCOUNTER — APPOINTMENT (OUTPATIENT)
Dept: MRI IMAGING | Facility: CLINIC | Age: 22
End: 2022-04-14

## 2022-04-14 LAB
ANA PAT FLD IF-IMP: ABNORMAL
ANACR T: ABNORMAL

## 2022-04-15 PROBLEM — Z78.9 OTHER SPECIFIED HEALTH STATUS: Chronic | Status: ACTIVE | Noted: 2022-04-07

## 2022-04-19 ENCOUNTER — APPOINTMENT (OUTPATIENT)
Dept: INTERNAL MEDICINE | Facility: CLINIC | Age: 22
End: 2022-04-19
Payer: MEDICAID

## 2022-04-19 PROCEDURE — 36415 COLL VENOUS BLD VENIPUNCTURE: CPT

## 2022-04-20 ENCOUNTER — APPOINTMENT (OUTPATIENT)
Dept: NEUROLOGY | Facility: CLINIC | Age: 22
End: 2022-04-20
Payer: MEDICAID

## 2022-04-20 VITALS
HEIGHT: 70 IN | WEIGHT: 120 LBS | BODY MASS INDEX: 17.18 KG/M2 | RESPIRATION RATE: 14 BRPM | SYSTOLIC BLOOD PRESSURE: 113 MMHG | DIASTOLIC BLOOD PRESSURE: 72 MMHG | HEART RATE: 70 BPM

## 2022-04-20 DIAGNOSIS — R42 DIZZINESS AND GIDDINESS: ICD-10-CM

## 2022-04-20 DIAGNOSIS — G43.909 MIGRAINE, UNSPECIFIED, NOT INTRACTABLE, W/OUT STATUS MIGRAINOSUS: ICD-10-CM

## 2022-04-20 LAB
ALBUMIN SERPL ELPH-MCNC: 4.9 G/DL
ALP BLD-CCNC: 69 U/L
ALT SERPL-CCNC: 26 U/L
ANION GAP SERPL CALC-SCNC: 14 MMOL/L
AST SERPL-CCNC: 20 U/L
BILIRUB SERPL-MCNC: 1.3 MG/DL
BUN SERPL-MCNC: 14 MG/DL
CALCIUM SERPL-MCNC: 10.1 MG/DL
CHLORIDE SERPL-SCNC: 103 MMOL/L
CK SERPL-CCNC: 177 U/L
CO2 SERPL-SCNC: 26 MMOL/L
CREAT SERPL-MCNC: 1.01 MG/DL
EGFR: 109 ML/MIN/1.73M2
GLUCOSE SERPL-MCNC: 88 MG/DL
POTASSIUM SERPL-SCNC: 4.2 MMOL/L
PROT SERPL-MCNC: 7.5 G/DL
SODIUM SERPL-SCNC: 142 MMOL/L

## 2022-04-20 PROCEDURE — 99203 OFFICE O/P NEW LOW 30 MIN: CPT

## 2022-04-20 RX ORDER — MECLIZINE HYDROCHLORIDE 25 MG/1
25 TABLET ORAL 3 TIMES DAILY
Qty: 30 | Refills: 0 | Status: DISCONTINUED | COMMUNITY
Start: 2022-04-05 | End: 2022-04-20

## 2022-04-20 NOTE — REVIEW OF SYSTEMS
[Sleep Disturbances] : sleep disturbances [As Noted in HPI] : as noted in HPI [Negative] : Heme/Lymph [de-identified] : Has been particularly worried about the future and his career, has been down, lack of interest in food or his usually activities.

## 2022-04-20 NOTE — PHYSICAL EXAM
[General Appearance - In No Acute Distress] : in no acute distress [General Appearance - Alert] : alert [Oriented To Time, Place, And Person] : oriented to person, place, and time [Impaired Insight] : insight and judgment were intact [Affect] : the affect was normal [Person] : oriented to person [Place] : oriented to place [Time] : oriented to time [Concentration Intact] : normal concentrating ability [Visual Intact] : visual attention was ~T not ~L decreased [Naming Objects] : no difficulty naming common objects [Repeating Phrases] : no difficulty repeating a phrase [Writing A Sentence] : no difficulty writing a sentence [Fluency] : fluency intact [Comprehension] : comprehension intact [Reading] : reading intact [Past History] : adequate knowledge of personal past history [Cranial Nerves Optic (II)] : visual acuity intact bilaterally,  visual fields full to confrontation, pupils equal round and reactive to light [Cranial Nerves Oculomotor (III)] : extraocular motion intact [Cranial Nerves Trigeminal (V)] : facial sensation intact symmetrically [Cranial Nerves Facial (VII)] : face symmetrical [Cranial Nerves Vestibulocochlear (VIII)] : hearing was intact bilaterally [Cranial Nerves Glossopharyngeal (IX)] : tongue and palate midline [Cranial Nerves Accessory (XI - Cranial And Spinal)] : head turning and shoulder shrug symmetric [Cranial Nerves Hypoglossal (XII)] : there was no tongue deviation with protrusion [Motor Tone] : muscle tone was normal in all four extremities [Motor Strength] : muscle strength was normal in all four extremities [No Muscle Atrophy] : normal bulk in all four extremities [Motor Handedness Right-Handed] : the patient is right hand dominant [Sensation Tactile Decrease] : light touch was intact [Balance] : balance was intact [2+] : Ankle jerk left 2+ [Sclera] : the sclera and conjunctiva were normal [PERRL With Normal Accommodation] : pupils were equal in size, round, reactive to light, with normal accommodation [Extraocular Movements] : extraocular movements were intact [Outer Ear] : the ears and nose were normal in appearance [Oropharynx] : the oropharynx was normal [Neck Appearance] : the appearance of the neck was normal [Neck Cervical Mass (___cm)] : no neck mass was observed [Jugular Venous Distention Increased] : there was no jugular-venous distention [Thyroid Diffuse Enlargement] : the thyroid was not enlarged [Thyroid Nodule] : there were no palpable thyroid nodules [Auscultation Breath Sounds / Voice Sounds] : lungs were clear to auscultation bilaterally [General Appearance - Well-Appearing] : healthy appearing [FreeTextEntry1] : over all thin body habitus [Sensation Pain / Temperature Decrease] : pain and temperature was intact [Past-pointing] : there was no past-pointing [Tremor] : no tremor present [Plantar Reflex Right Only] : normal on the right [Plantar Reflex Left Only] : normal on the left [Hearing Threshold Finger Rub Not Trigg] : hearing was normal [No CVA Tenderness] : no ~M costovertebral angle tenderness [Abnormal Walk] : normal gait [Nail Clubbing] : no clubbing  or cyanosis of the fingernails [Musculoskeletal - Swelling] : no joint swelling seen [Skin Color & Pigmentation] : normal skin color and pigmentation [] : no rash

## 2022-04-20 NOTE — HISTORY OF PRESENT ILLNESS
[FreeTextEntry1] : Mr. SONNY CHAPIN is a 21 year old man with pmhx of acne, headaches who presents for a follow up. He was recently found with elevated ck level to 22k. Was admitted received IV hydration. Last 2k. He complains of headaches, generalized weakness and fatigue. Discussed past blood work. Normal crp. His MRI of the head was declined. \par \par Pt here for headaches, nausea, and dizziness. Has been having headache since right after highs school, 2 years. Describes as throbbing as squeezing, all over, can be be any side, lasting for for a few minutes up, hours, to a few days on and off. Will also feel finger tingling, which started in the left hand first two fingers, now in the right, starting to have sensitivity to light which at times will bring on the headache. Doesn't take anything for the pain. Doesn't interrupt his activity, usually 6/10 average and 10/10 at worse rarely. No one in the family has headaches.  Also had episodes of lightheadedness. Will come out of nowhere is standing or doing anywhere, will feel a intense pressure in his head and then vision will become blurred and then loses tone in his body. has never lost consciousness. Will hold onto something or sit down. Does not have SOB or palpitations, no nausea, sense of doom or rising sensation in the stomach.  Has a sensation of dizziness which does not occur at the same time as headache, that the world is moving and can't think straight, no double vision, worse when laying down to sleep, lasts a few minutes, no nausea, occasional tinnitus but no hearing loss. No problems walking. Gets of frequently at night, 2-3 times, can usually get back to sleep within 5-10 minutes. No particular reason to why he wakes up, no rumination. 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 \par use to be very interested in [place]ne and has been losing interest because he feels the scents have changed and can't stand them.  Two weeks ago his CPK was 22k. had no weakness, muscle pain, dark urine, no fever. Has been downtrending since then.  Has two MRIs of the head that were reportedly normal. \par No family history of muscle disorders or weakness.

## 2022-04-20 NOTE — ASSESSMENT
[FreeTextEntry1] : 22 y/o man p/w 2 years of headache, now daily , possible migraine, does not meet all criteria. Also with intermittent lightheadedness during the day, but no episodes of LOC. He has been experiencing lack of interest, poor appetite, and down  mood for the past year. \par \par Impression: chronic daily headache, possible MDD. \par \par [] Nurtec 75 mg every other day, will put through and see if approved. \par [] Also prescribed nortriptyline 25mg at night, which could help his headache but may also help with depressive symptoms. \par [] F/U rheum for evaluation for elevated CPK and DORIS. \par [] Routine EEG for episodic dizziness and lightheadedness. \par [] psychiatry \par [] F/U 6-8 wks with Breanna Anderson NP. \par \par

## 2022-04-21 ENCOUNTER — APPOINTMENT (OUTPATIENT)
Dept: GASTROENTEROLOGY | Facility: CLINIC | Age: 22
End: 2022-04-21
Payer: MEDICAID

## 2022-04-21 VITALS
TEMPERATURE: 97.8 F | DIASTOLIC BLOOD PRESSURE: 72 MMHG | HEART RATE: 91 BPM | OXYGEN SATURATION: 98 % | SYSTOLIC BLOOD PRESSURE: 118 MMHG | HEIGHT: 70 IN | BODY MASS INDEX: 17.09 KG/M2 | WEIGHT: 119.38 LBS

## 2022-04-21 DIAGNOSIS — R14.0 ABDOMINAL DISTENSION (GASEOUS): ICD-10-CM

## 2022-04-21 DIAGNOSIS — R10.9 UNSPECIFIED ABDOMINAL PAIN: ICD-10-CM

## 2022-04-21 PROCEDURE — 99204 OFFICE O/P NEW MOD 45 MIN: CPT

## 2022-04-21 NOTE — PHYSICAL EXAM
[General Appearance - Alert] : alert [General Appearance - In No Acute Distress] : in no acute distress [General Appearance - Well Developed] : well developed [General Appearance - Well-Appearing] : healthy appearing [Sclera] : the sclera and conjunctiva were normal [Oropharynx] : the oropharynx was normal [Neck Appearance] : the appearance of the neck was normal [Neck Cervical Mass (___cm)] : no neck mass was observed [Respiration, Rhythm And Depth] : normal respiratory rhythm and effort [Exaggerated Use Of Accessory Muscles For Inspiration] : no accessory muscle use [Auscultation Breath Sounds / Voice Sounds] : lungs were clear to auscultation bilaterally [Heart Rate And Rhythm] : heart rate was normal and rhythm regular [Heart Sounds] : normal S1 and S2 [Heart Sounds Gallop] : no gallops [Murmurs] : no murmurs [Heart Sounds Pericardial Friction Rub] : no pericardial rub [Edema] : there was no peripheral edema [Abdomen Soft] : soft [Abdomen Tenderness] : non-tender [] : no hepato-splenomegaly [Abdomen Mass (___ Cm)] : no abdominal mass palpated [Abdomen Hernia] : no hernia was discovered [Cervical Lymph Nodes Enlarged Posterior Bilaterally] : posterior cervical [Cervical Lymph Nodes Enlarged Anterior Bilaterally] : anterior cervical [Supraclavicular Lymph Nodes Enlarged Bilaterally] : supraclavicular [No CVA Tenderness] : no ~M costovertebral angle tenderness [Abnormal Walk] : normal gait [Nail Clubbing] : no clubbing  or cyanosis of the fingernails [Skin Color & Pigmentation] : normal skin color and pigmentation [Oriented To Time, Place, And Person] : oriented to person, place, and time [Impaired Insight] : insight and judgment were intact [Affect] : the affect was normal [Mood] : the mood was normal [FreeTextEntry1] : Appears thin but otherwise comfortable/NAD/nontoxic appearing.

## 2022-04-21 NOTE — ASSESSMENT
[FreeTextEntry1] : Impression:\par \par #1 GERD–occasional episodes of heartburn described as vague chest discomfort and burning in throat.  Episodes approximately twice per month with occasional regurgitation.  Alarm symptoms not reported.\par \par #2 poor appetite–underweight.  Apparently lifelong.  Component attributed to ARFID as per pediatric eating disorder clinic when last evaluated 7 years ago.  Exacerbated by chronic anxiety.\par \par #3 abdominal gaseous discomfort–complains of bloating.  Possibly attributed to dietary triggers from gaseous foods.\par \par #4 elevated aminotransferases–unexplained elevated aminotransferases on 4/5/2022 with apparent recovery.  Etiology unclear–no evident medications, toxins or viral etiology elucidated so far.\par \par #5 elevated CPK-striking elevated CPK at time of testing on 4/5.  Resolved.  Etiology unclear.\par \par #6 chronic headaches.\par \par #7 chronic anxiety.

## 2022-04-21 NOTE — CONSULT LETTER
[Dear  ___] : Dear  [unfilled], [Consult Letter:] : I had the pleasure of evaluating your patient, [unfilled]. [( Thank you for referring [unfilled] for consultation for _____ )] : Thank you for referring [unfilled] for consultation for [unfilled] [Please see my note below.] : Please see my note below. [Consult Closing:] : Thank you very much for allowing me to participate in the care of this patient.  If you have any questions, please do not hesitate to contact me. [Sincerely,] : Sincerely, [FreeTextEntry2] : Dr. Daniel Rinaldi[facility] [FreeTextEntry3] : Clarke Davis MD

## 2022-04-21 NOTE — REVIEW OF SYSTEMS
[As Noted in HPI] : as noted in HPI [Dizziness] : dizziness [Anxiety] : anxiety [Negative] : Heme/Lymph [de-identified] : Headaches.
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
## 2022-04-21 NOTE — REASON FOR VISIT
[Consultation] : a consultation visit [FreeTextEntry1] : for evaluation of heartburn, poor appetite and gaseous discomfort.

## 2022-04-22 ENCOUNTER — APPOINTMENT (OUTPATIENT)
Dept: NEUROLOGY | Facility: CLINIC | Age: 22
End: 2022-04-22
Payer: MEDICAID

## 2022-04-22 PROCEDURE — 95816 EEG AWAKE AND DROWSY: CPT

## 2022-04-24 ENCOUNTER — NON-APPOINTMENT (OUTPATIENT)
Age: 22
End: 2022-04-24

## 2022-04-25 LAB — UBIQUINONE10 SERPL-MCNC: 0.84 UG/ML

## 2022-04-26 ENCOUNTER — APPOINTMENT (OUTPATIENT)
Dept: HEPATOLOGY | Facility: CLINIC | Age: 22
End: 2022-04-26
Payer: MEDICAID

## 2022-04-26 ENCOUNTER — APPOINTMENT (OUTPATIENT)
Dept: HEPATOLOGY | Facility: CLINIC | Age: 22
End: 2022-04-26

## 2022-04-26 VITALS
SYSTOLIC BLOOD PRESSURE: 110 MMHG | OXYGEN SATURATION: 98 % | HEART RATE: 86 BPM | BODY MASS INDEX: 16.54 KG/M2 | DIASTOLIC BLOOD PRESSURE: 72 MMHG | HEIGHT: 70 IN | WEIGHT: 115.5 LBS | TEMPERATURE: 98.6 F

## 2022-04-26 DIAGNOSIS — E80.6 OTHER DISORDERS OF BILIRUBIN METABOLISM: ICD-10-CM

## 2022-04-26 PROCEDURE — 99213 OFFICE O/P EST LOW 20 MIN: CPT

## 2022-04-26 RX ORDER — ERGOCALCIFEROL 1.25 MG/1
1.25 MG CAPSULE, LIQUID FILLED ORAL
Qty: 4 | Refills: 0 | Status: DISCONTINUED | COMMUNITY
Start: 2021-12-20 | End: 2022-04-26

## 2022-04-26 RX ORDER — KETOCONAZOLE 20 MG/G
2 CREAM TOPICAL
Qty: 1 | Refills: 1 | Status: DISCONTINUED | COMMUNITY
Start: 2021-10-09 | End: 2022-04-26

## 2022-04-26 RX ORDER — BENZOYL PEROXIDE 50 MG/G
5 EMULSION TOPICAL
Qty: 1 | Refills: 11 | Status: DISCONTINUED | COMMUNITY
Start: 2019-06-27 | End: 2022-04-26

## 2022-04-26 RX ORDER — UBIDECARENONE/VIT E ACET 100MG-5
50 MCG CAPSULE ORAL
Qty: 90 | Refills: 1 | Status: DISCONTINUED | COMMUNITY
Start: 2021-12-20 | End: 2022-04-26

## 2022-04-26 NOTE — PHYSICAL EXAM
[General Appearance - Alert] : alert [General Appearance - In No Acute Distress] : in no acute distress [Sclera] : the sclera and conjunctiva were normal [Neck Appearance] : the appearance of the neck was normal [Neck Cervical Mass (___cm)] : no neck mass was observed [] : no respiratory distress [Respiration, Rhythm And Depth] : normal respiratory rhythm and effort [Auscultation Breath Sounds / Voice Sounds] : lungs were clear to auscultation bilaterally [Heart Rate And Rhythm] : heart rate was normal and rhythm regular [Heart Sounds] : normal S1 and S2 [Edema] : there was no peripheral edema [Bowel Sounds] : normal bowel sounds [Abdomen Soft] : soft [Abdomen Tenderness] : non-tender [Abdomen Mass (___ Cm)] : no abdominal mass palpated [Nail Clubbing] : no clubbing  or cyanosis of the fingernails [Skin Turgor] : normal skin turgor [Oriented To Time, Place, And Person] : oriented to person, place, and time [Scleral Icterus] : No Scleral Icterus [Abdominal  Ascites] : no ascites [Asterixis] : no asterixis observed [Jaundice] : No jaundice [Palmar Erythema] : no Palmar Erythema

## 2022-04-26 NOTE — ASSESSMENT
[FreeTextEntry1] : Mr. Jam Mccarthy 21 yoM here for evaluation of elevated liver enzymes.\par \par #Elevated aminotransferases\par -Elevated aminotransferases on 4/5/2022  ,  now normalized. \par -Potential dili from L-arginine and pre-workout supplements, has been off since 4/6. Advised not to re-start. Can continue with Vit D. \par -Labs ordered today to rule out other, less common causes of chronic liver disease.\par -FibroScan ordered for non-invasive estimation of degree of steatosis and stage of fibrosis \par \par #Elevated CPK- High CPK at time of testing on 4/5. Resolved. Etiology unclear.\par \par Plan:\par BW, [place] US, fibroscan test, F/U in 3 weeks. \par

## 2022-04-26 NOTE — HISTORY OF PRESENT ILLNESS
[de-identified] : Mr. Jam Mccarthy 21 yoM here for evaluation of elevated liver enzymes. During a routine medical assessment  was found to have elevated CPK to 20,000 and elevated liver enzymes, , , Tbil 1.4 on outpatient labs on 4/5/22, was sent to ED. He was given IV fluid, advised to stopped all supplements and was discharged same day.  At the time pt denies fevers, muscle weakness/pain, urinary sx, abd pain, vomiting. No recent excessive exercise. No recent illness. States he takes Vitamin D & L-arginine and pre-workout supplements (for a while). Not taken Doxycycline since last year. Started nortriptyline on 4/20 and took only one dose and was stopped on 4/21. Has not started nurtec since not yet approved by insurance. Denies any etoh, drug use,  mushroom use, or homeopathic medications.  He has been evaluated by multiple specialists regarding unexplained CPK. Normal blood work in Dec 2021.  Recent BW from 4/19/22 showed normalization in CPK and liver enzymes with mildly elevated Tbil 1.3. \par \par Has chronic poor appetite, occasional heartburn,  no abd pain. Overall feels well.\par \par BW 4/6/2022:\par -DORIS positive 1:80.\par -Smooth muscle antibody < 1:20.\par -Mitochondrial antibody <1:20.\par -HCV antibody negative.\par -HBV serology: HBsAg negative. HBsAb negative. Negative. HBsAb\par -CPK 23,967.\par Additional serology including ribosomal P protein antibody, centromere antibody, Sjogren's syndrome antibodies, Rakel–1 antibody, scleroderma antibodies, double-stranded DNA antibody, chromatin antibody and ADOLFO antibody all negative.\par \par Labs repeated 4/11/2022:\par -CMP: Bilirubin 1.8, AST 50, ALT 71. Other liver tests normal.\par -.\par \par Lab assessment 4/19/2022:\par -CMP: Normal except total bilirubin 1.3. AST 20 and ALT 26.\par -.\par \par

## 2022-04-27 ENCOUNTER — APPOINTMENT (OUTPATIENT)
Dept: RHEUMATOLOGY | Facility: CLINIC | Age: 22
End: 2022-04-27
Payer: MEDICAID

## 2022-04-27 VITALS
BODY MASS INDEX: 17.18 KG/M2 | HEIGHT: 70 IN | OXYGEN SATURATION: 98 % | WEIGHT: 120 LBS | TEMPERATURE: 98 F | HEART RATE: 81 BPM | DIASTOLIC BLOOD PRESSURE: 72 MMHG | SYSTOLIC BLOOD PRESSURE: 112 MMHG

## 2022-04-27 DIAGNOSIS — R74.8 ABNORMAL LEVELS OF OTHER SERUM ENZYMES: ICD-10-CM

## 2022-04-27 DIAGNOSIS — R53.81 OTHER MALAISE: ICD-10-CM

## 2022-04-27 DIAGNOSIS — R74.01 ELEVATION OF LEVELS OF LIVER TRANSAMINASE LEVELS: ICD-10-CM

## 2022-04-27 DIAGNOSIS — R76.8 OTHER SPECIFIED ABNORMAL IMMUNOLOGICAL FINDINGS IN SERUM: ICD-10-CM

## 2022-04-27 DIAGNOSIS — R53.83 OTHER MALAISE: ICD-10-CM

## 2022-04-27 PROCEDURE — 99204 OFFICE O/P NEW MOD 45 MIN: CPT

## 2022-04-27 RX ORDER — NORTRIPTYLINE HYDROCHLORIDE 25 MG/1
25 CAPSULE ORAL
Qty: 30 | Refills: 2 | Status: DISCONTINUED | COMMUNITY
Start: 2022-04-20 | End: 2022-04-27

## 2022-04-27 RX ORDER — RIMEGEPANT SULFATE 75 MG/75MG
75 TABLET, ORALLY DISINTEGRATING ORAL
Qty: 18 | Refills: 2 | Status: DISCONTINUED | COMMUNITY
Start: 2022-04-20 | End: 2022-04-27

## 2022-04-28 PROBLEM — R53.81 MALAISE AND FATIGUE: Status: ACTIVE | Noted: 2022-04-28

## 2022-04-29 ENCOUNTER — NON-APPOINTMENT (OUTPATIENT)
Age: 22
End: 2022-04-29

## 2022-05-02 ENCOUNTER — NON-APPOINTMENT (OUTPATIENT)
Age: 22
End: 2022-05-02

## 2022-05-04 PROBLEM — G43.909 MIGRAINE HEADACHE: Status: ACTIVE | Noted: 2022-05-04

## 2022-05-10 ENCOUNTER — NON-APPOINTMENT (OUTPATIENT)
Age: 22
End: 2022-05-10

## 2022-05-10 LAB
A1AT SERPL-MCNC: 131 MG/DL
ACE BLD-CCNC: 46 U/L
AFP-TM SERPL-MCNC: 1.9 NG/ML
ALBUMIN MFR SERPL ELPH: 60.3 %
ALBUMIN SERPL ELPH-MCNC: 5.1 G/DL
ALBUMIN SERPL-MCNC: 4.9 G/DL
ALBUMIN/GLOB SERPL: 1.5 RATIO
ALDOLASE SERPL-CCNC: 5.8 U/L
ALP BLD-CCNC: 74 U/L
ALPHA1 GLOB MFR SERPL ELPH: 3.3 %
ALPHA1 GLOB SERPL ELPH-MCNC: 0.3 G/DL
ALPHA2 GLOB MFR SERPL ELPH: 10.3 %
ALPHA2 GLOB SERPL ELPH-MCNC: 0.8 G/DL
ALT SERPL-CCNC: 13 U/L
ANION GAP SERPL CALC-SCNC: 14 MMOL/L
AST SERPL-CCNC: 17 U/L
B-GLOBULIN MFR SERPL ELPH: 9.7 %
B-GLOBULIN SERPL ELPH-MCNC: 0.8 G/DL
BASOPHILS # BLD AUTO: 0.05 K/UL
BASOPHILS NFR BLD AUTO: 1 %
BILIRUB SERPL-MCNC: 2 MG/DL
BUN SERPL-MCNC: 10 MG/DL
C3 SERPL-MCNC: 115 MG/DL
C4 SERPL-MCNC: 30 MG/DL
CALCIUM SERPL-MCNC: 10 MG/DL
CCP AB SER IA-ACNC: <8 UNITS
CERULOPLASMIN SERPL-MCNC: 19 MG/DL
CHLORIDE SERPL-SCNC: 100 MMOL/L
CK SERPL-CCNC: 165 U/L
CMV DNA SPEC QL NAA+PROBE: NOT DETECTED IU/ML
CMVPCR LOG: NOT DETECTED LOG10IU/ML
CO2 SERPL-SCNC: 26 MMOL/L
CREAT SERPL-MCNC: 0.93 MG/DL
CRP SERPL-MCNC: <3 MG/L
DEPRECATED KAPPA LC FREE/LAMBDA SER: 1.14 RATIO
EBV EA AB SER IA-ACNC: <5 U/ML
EBV EA AB TITR SER IF: POSITIVE
EBV EA IGG SER QL IA: >600 U/ML
EBV EA IGG SER-ACNC: NEGATIVE
EBV EA IGM SER IA-ACNC: NEGATIVE
EBV PATRN SPEC IB-IMP: NORMAL
EBV VCA IGG SER IA-ACNC: 149 U/ML
EBV VCA IGM SER QL IA: <10 U/ML
EGFR: 121 ML/MIN/1.73M2
EOSINOPHIL # BLD AUTO: 0.06 K/UL
EOSINOPHIL NFR BLD AUTO: 1.2 %
EPSTEIN-BARR VIRUS CAPSID ANTIGEN IGG: POSITIVE
ERYTHROCYTE [SEDIMENTATION RATE] IN BLOOD BY WESTERGREN METHOD: < 2 MM/HR
FERRITIN SERPL-MCNC: 120 NG/ML
FOLATE SERPL-MCNC: 8.1 NG/ML
GAMMA GLOB FLD ELPH-MCNC: 1.3 G/DL
GAMMA GLOB MFR SERPL ELPH: 16.4 %
GGT SERPL-CCNC: 9 U/L
GLUCOSE SERPL-MCNC: 91 MG/DL
HCT VFR BLD CALC: 45.4 %
HEPATITIS A IGG ANTIBODY: REACTIVE
HEPATITIS E IGM ABY: DETECTED
HGB BLD-MCNC: 15.1 G/DL
IGA SER QL IEP: 163 MG/DL
IGG SER QL IEP: 1282 MG/DL
IGM SER QL IEP: 124 MG/DL
IMM GRANULOCYTES NFR BLD AUTO: 0 %
INR PPP: 1.13 RATIO
INTERPRETATION SERPL IEP-IMP: NORMAL
KAPPA LC CSF-MCNC: 1.33 MG/DL
KAPPA LC SERPL-MCNC: 1.52 MG/DL
LKM AB SER QL IF: <20.1 UNITS
LYMPHOCYTES # BLD AUTO: 2.74 K/UL
LYMPHOCYTES NFR BLD AUTO: 52.6 %
M PROTEIN SPEC IFE-MCNC: NORMAL
MAN DIFF?: NORMAL
MCHC RBC-ENTMCNC: 28.9 PG
MCHC RBC-ENTMCNC: 33.3 GM/DL
MCV RBC AUTO: 87 FL
MONOCYTES # BLD AUTO: 0.4 K/UL
MONOCYTES NFR BLD AUTO: 7.7 %
MYOGLOBIN SERPL-MCNC: 24 NG/ML
NEUTROPHILS # BLD AUTO: 1.96 K/UL
NEUTROPHILS NFR BLD AUTO: 37.5 %
PLATELET # BLD AUTO: 253 K/UL
POTASSIUM SERPL-SCNC: 3.9 MMOL/L
PROT SERPL-MCNC: 7.9 G/DL
PROT SERPL-MCNC: 8.2 G/DL
PROT SERPL-MCNC: 8.2 G/DL
PT BLD: 13.2 SEC
RBC # BLD: 5.22 M/UL
RBC # FLD: 12.2 %
RF+CCP IGG SER-IMP: NEGATIVE
RHEUMATOID FACT SER QL: <10 IU/ML
SODIUM SERPL-SCNC: 140 MMOL/L
SOLUBLE LIVER IGG SER IA-ACNC: 1.1
VIT B12 SERPL-MCNC: 653 PG/ML
WBC # FLD AUTO: 5.21 K/UL

## 2022-05-12 LAB
EJ AB SER QL: NEGATIVE
ENA JO1 AB SER IA-ACNC: <20 UNITS
ENA PM/SCL AB SER-ACNC: <20 UNITS
ENA SM+RNP AB SER IA-ACNC: <20 UNITS
ENA SS-A IGG SER QL: <20 UNITS
FIBRILLARIN AB SER QL: NEGATIVE
KU AB SER QL: NEGATIVE
MDA-5 (P140)(CADM-140): <20 UNITS
MI2 AB SER QL: NEGATIVE
NXP-2 (P140): <20 UNITS
OJ AB SER QL: NEGATIVE
PL12 AB SER QL: NEGATIVE
PL7 AB SER QL: NEGATIVE
SRP AB SERPL QL: NEGATIVE
TIF GAMMA (P155/140): <20 UNITS
U2 SNRNP AB SER QL: NEGATIVE

## 2022-05-26 ENCOUNTER — APPOINTMENT (OUTPATIENT)
Dept: HEPATOLOGY | Facility: CLINIC | Age: 22
End: 2022-05-26
Payer: MEDICAID

## 2022-05-26 LAB
ALBUMIN SERPL ELPH-MCNC: 5 G/DL
ALP BLD-CCNC: 76 U/L
ALT SERPL-CCNC: 16 U/L
AST SERPL-CCNC: 18 U/L
BILIRUB DIRECT SERPL-MCNC: 0.3 MG/DL
BILIRUB INDIRECT SERPL-MCNC: 1.9 MG/DL
BILIRUB SERPL-MCNC: 2.2 MG/DL
PROT SERPL-MCNC: 7.6 G/DL

## 2022-05-26 PROCEDURE — 91200 LIVER ELASTOGRAPHY: CPT

## 2022-05-31 LAB
ANNOTATION COMMENT IMP: NOT DETECTED
HEPATITIS E QUANT BY PCR, IU/ML: NORMAL IU/ML
HEPATITIS E QUANT BY PCR, LOG IU/ML: <3.3 LOG IU/ML
HEPATITIS E QUANT BY PCR, SOURCE: NORMAL

## 2022-06-06 LAB — HEPATITIS E IGM ABY: DETECTED

## 2022-06-15 ENCOUNTER — APPOINTMENT (OUTPATIENT)
Dept: HEPATOLOGY | Facility: CLINIC | Age: 22
End: 2022-06-15
Payer: MEDICAID

## 2022-06-15 VITALS
DIASTOLIC BLOOD PRESSURE: 65 MMHG | SYSTOLIC BLOOD PRESSURE: 100 MMHG | HEIGHT: 70 IN | TEMPERATURE: 98 F | WEIGHT: 116.2 LBS | BODY MASS INDEX: 16.64 KG/M2 | HEART RATE: 88 BPM | OXYGEN SATURATION: 99 %

## 2022-06-15 DIAGNOSIS — B17.2 ACUTE HEPATITIS E: ICD-10-CM

## 2022-06-15 PROCEDURE — 99213 OFFICE O/P EST LOW 20 MIN: CPT

## 2022-06-15 NOTE — HISTORY OF PRESENT ILLNESS
[de-identified] : Mr. Jam Mccarthy 21 yoM was seen on 4/26/22 for elevated liver enzymes here for follow up. \par \par 6/15/22: Main complaint is fatigue which he noticed since April and has increased. No abdominal pain. His BW from 4/27/22 showed acute hep E. His repeat BW from 5/26/22 still showed HEV IgM pos with neg Hep E PCR. ALT 16, AST 18, TB 1.9, direct bree 0.3\par Fibroscan test 5/26/22 showed F3 (12.5 kPa), S0 ()\par \par 4/26/22 Initial visit: During a routine medical assessment  was found to have elevated CPK to 20,000 and elevated liver enzymes, , , Tbil 1.4 on outpatient labs on 4/5/22, was sent to ED. He was given IV fluid, advised to stopped all supplements and was discharged same day.  At the time pt denies fevers, muscle weakness/pain, urinary sx, abd pain, vomiting. No recent excessive exercise. No recent illness. States he takes Vitamin D & L-arginine and pre-workout supplements (for a while). Not taken Doxycycline since last year. Started nortriptyline on 4/20 and took only one dose and was stopped on 4/21. Has not started nurtec since not yet approved by insurance. Denies any etoh, drug use,  mushroom use, or homeopathic medications.  He has been evaluated by multiple specialists regarding unexplained CPK. Normal blood work in Dec 2021.  Recent BW from 4/19/22 showed normalization in CPK and liver enzymes with mildly elevated Tbil 1.3. \par \par Has chronic poor appetite, occasional heartburn,  no abd pain. Overall feels well.\par \par BW 4/6/2022:\par -DORIS positive 1:80.\par -Smooth muscle antibody < 1:20.\par -Mitochondrial antibody <1:20.\par -HCV antibody negative.\par -HBV serology: HBsAg negative. HBsAb negative. Negative. HBsAb\par -CPK 23,967.\par Additional serology including ribosomal P protein antibody, centromere antibody, Sjogren's syndrome antibodies, Rakel–1 antibody, scleroderma antibodies, double-stranded DNA antibody, chromatin antibody and ADOLFO antibody all negative.\par \par Labs repeated 4/11/2022:\par -CMP: Bilirubin 1.8, AST 50, ALT 71. Other liver tests normal.\par -.\par \par Lab assessment 4/19/2022:\par -CMP: Normal except total bilirubin 1.3. AST 20 and ALT 26.\par -.\par \par BW 4/27/22 ALT 13, AST 17, ALP 74, TB 2.0. HEV IgM pos. His autoimmune markers such as  soluble liver antigen antibody, Alpha-1 antitrypsin level and ceruloplasmin were within normal range.\par \par

## 2022-06-15 NOTE — PHYSICAL EXAM
[General Appearance - Alert] : alert [Sclera] : the sclera and conjunctiva were normal [General Appearance - In No Acute Distress] : in no acute distress [Neck Appearance] : the appearance of the neck was normal [Neck Cervical Mass (___cm)] : no neck mass was observed [] : no respiratory distress [Respiration, Rhythm And Depth] : normal respiratory rhythm and effort [Auscultation Breath Sounds / Voice Sounds] : lungs were clear to auscultation bilaterally [Heart Rate And Rhythm] : heart rate was normal and rhythm regular [Heart Sounds] : normal S1 and S2 [Edema] : there was no peripheral edema [Bowel Sounds] : normal bowel sounds [Abdomen Soft] : soft [Abdomen Tenderness] : non-tender [Abdomen Mass (___ Cm)] : no abdominal mass palpated [Nail Clubbing] : no clubbing  or cyanosis of the fingernails [Skin Turgor] : normal skin turgor [Oriented To Time, Place, And Person] : oriented to person, place, and time [Scleral Icterus] : No Scleral Icterus [Abdominal  Ascites] : no ascites [Asterixis] : no asterixis observed [Jaundice] : No jaundice [Palmar Erythema] : no Palmar Erythema

## 2022-06-15 NOTE — ASSESSMENT
[FreeTextEntry1] : Mr. Jam Mccarthy 21 yoM here for follow up for elevated liver enzymes and acute hepatitis E\par \par #Elevated aminotransferases\par -Elevated aminotransferases on 4/5/2022  ,  now normalized. Elevation was probably related to acute hep E (HEV IgM pos). His HEV IgM remained positive but PCR is not detected. Explained that HEV IgM pos can take a few months to become neg. \par -Liver workup was neg for other chronic liver disease.\par -Fibroscan test 5/26/22 showed F3 (12.5 kPa), S0 ()\par Result may be inaccurate due to infection with hep E at the time of testing causing increased liver stiffness. \par Will repeat fibroscan test in 3 months\par \par #Elevated Tbil with normal direct bilirubin level.  This represents Gilbert's syndrome and is typically harmless and treatment isn't needed.  \par \par #Health maintenance\par  HAV immune.\par  HBV non-immune, was vaccinated in the past. Recommended revaccinated with Heplisav-B.\par \par Plan:\par Repeat BW in 3 months to check HEV IgM. F/U in 6 months with repeat fibroscan test. \par \par \par \par \par 
98

## 2022-07-05 ENCOUNTER — APPOINTMENT (OUTPATIENT)
Dept: NEUROLOGY | Facility: CLINIC | Age: 22
End: 2022-07-05

## 2022-07-05 VITALS
OXYGEN SATURATION: 99 % | BODY MASS INDEX: 17.18 KG/M2 | DIASTOLIC BLOOD PRESSURE: 73 MMHG | WEIGHT: 120 LBS | HEART RATE: 77 BPM | HEIGHT: 70 IN | SYSTOLIC BLOOD PRESSURE: 112 MMHG | RESPIRATION RATE: 16 BRPM | TEMPERATURE: 98 F

## 2022-07-05 DIAGNOSIS — F32.A ANXIETY DISORDER, UNSPECIFIED: ICD-10-CM

## 2022-07-05 DIAGNOSIS — F41.9 ANXIETY DISORDER, UNSPECIFIED: ICD-10-CM

## 2022-07-05 PROCEDURE — 99214 OFFICE O/P EST MOD 30 MIN: CPT

## 2022-07-05 NOTE — HISTORY OF PRESENT ILLNESS
[FreeTextEntry1] : Blair presents today for headache followup. was previously seen by Dr. Daniels for headaches which he has tried Qulipta, Topamax, nortriptyline which was not effective  \par Location: frontal left frontal right,  temporal left and temporal right, one sided at times\par described as pressure/ squeezing. \par Timing of headache: varies\par Associated symptoms: photophobia from sun  if severe \par Pertinent negatives: phonophobia, dizziness, anosmia, blurred vision,visual aura, scotoma, memory impairment, neck stiffness, nausea\par Relived by:  rest,  \par Severity:  6/7\par Occurs; daily\par Duration:30 mins-days\par Sleeps:  6hrs\par Hydration: water:3 bottles   caffeine:rarely\par Triggers: unknown \par vaccinated x3, no covid \par feels has been more tired, hard to focus on things, easily frustrated\par \par \par \par \par Inital visit by Dr. James\par Mr. SONNY CHAPIN is a 21 year old man with pmhx of acne, headaches who presents for a follow up. He was recently found with elevated ck level to 22k. Was admitted received IV hydration. Last 2k. He complains of headaches, generalized weakness and fatigue. Discussed past blood work. Normal crp. His MRI of the head was declined. \par \par Pt here for headaches, nausea, and dizziness. Has been having headache since right after highs school, 2 years. Describes as throbbing as squeezing, all over, can be be any side, lasting for for a few minutes up, hours, to a few days on and off. Will also feel finger tingling, which started in the left hand first two fingers, now in the right, starting to have sensitivity to light which at times will bring on the headache. Doesn't take anything for the pain. Doesn't interrupt his activity, usually 6/10 average and 10/10 at worse rarely. No one in the family has headaches. Also had episodes of lightheadedness. Will come out of nowhere is standing or doing anywhere, will feel a intense pressure in his head and then vision will become blurred and then loses tone in his body. has never lost consciousness. Will hold onto something or sit down. Does not have SOB or palpitations, no nausea, sense of doom or rising sensation in the stomach. Has a sensation of dizziness which does not occur at the same time as headache, that the world is moving and can't think straight, no double vision, worse when laying down to sleep, lasts a few minutes, no nausea, occasional tinnitus but no hearing loss. No problems walking. Gets of frequently at night, 2-3 times, can usually get back to sleep within 5-10 minutes. No particular reason to why he wakes up, no rumination. 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 \par use to be very interested in [place] and has been losing interest because he feels the scents have changed and can't stand them. Two weeks ago his CPK was 22k. had no weakness, muscle pain, dark urine, no fever. Has been downtrending since then. Has two MRIs of the head that were reportedly normal. \par No family history of muscle disorders or weakness.

## 2022-07-05 NOTE — ASSESSMENT
[FreeTextEntry1] : retrial nortriptyline 10mg qhs x2weeks titrated to 20mg if tolerated\par  \par Keeping a diary has been discussed, including Apps- Migraine rainer.  \par  Manage stress. Stress may trigger a migraine. Learn new ways to relax, such as\par deep breathing.referred to talk therapy\par Create a sleep schedule. Go to bed and get up at the same times each day. Do\par not watch television or go on electronics in bed.\par Eat regular meals. Include healthy foods such as include fruit, vegetables increase water intake.\par contact me if there are any changes in the quality or severity of the headaches.\par All questions answered, understanding verbalized.Patient in agreement with plan of care\par \par

## 2022-07-05 NOTE — PHYSICAL EXAM
[Person] : oriented to person [Place] : oriented to place [Time] : oriented to time [Concentration Intact] : normal concentrating ability [Naming Objects] : no difficulty naming common objects [Fluency] : fluency intact [Past History] : adequate knowledge of personal past history [Cranial Nerves Optic (II)] : visual acuity intact bilaterally,  visual fields full to confrontation, pupils equal round and reactive to light [Cranial Nerves Oculomotor (III)] : extraocular motion intact [Cranial Nerves Trigeminal (V)] : facial sensation intact symmetrically [Cranial Nerves Facial (VII)] : face symmetrical [Cranial Nerves Vestibulocochlear (VIII)] : hearing was intact bilaterally [Cranial Nerves Glossopharyngeal (IX)] : tongue and palate midline [Cranial Nerves Accessory (XI - Cranial And Spinal)] : head turning and shoulder shrug symmetric [Cranial Nerves Hypoglossal (XII)] : there was no tongue deviation with protrusion [Motor Tone] : muscle tone was normal in all four extremities [Motor Strength] : muscle strength was normal in all four extremities [No Muscle Atrophy] : normal bulk in all four extremities [Sensation Tactile Decrease] : light touch was intact [Abnormal Walk] : normal gait [Balance] : balance was intact [2+] : Ankle jerk left 2+ [General Appearance - In No Acute Distress] : in no acute distress [Short Term Intact] : short term memory intact [Remote Intact] : remote memory intact [Current Events] : adequate knowledge of current events [Past-pointing] : there was no past-pointing [Tremor] : no tremor present [Plantar Reflex Right Only] : normal on the right [Plantar Reflex Left Only] : normal on the left

## 2022-07-05 NOTE — REVIEW OF SYSTEMS
[As Noted in HPI] : as noted in HPI [Negative] : Heme/Lymph [de-identified] : decreased appetite, lack of energy

## 2022-07-12 LAB
25(OH)D3 SERPL-MCNC: 26.3 NG/ML
ALBUMIN SERPL ELPH-MCNC: 4.5 G/DL
ALP BLD-CCNC: 75 U/L
ALT SERPL-CCNC: 26 U/L
ANION GAP SERPL CALC-SCNC: 9 MMOL/L
AST SERPL-CCNC: 21 U/L
BILIRUB SERPL-MCNC: 1 MG/DL
BUN SERPL-MCNC: 10 MG/DL
CALCIUM SERPL-MCNC: 9.6 MG/DL
CHLORIDE SERPL-SCNC: 102 MMOL/L
CO2 SERPL-SCNC: 29 MMOL/L
CREAT SERPL-MCNC: 1.01 MG/DL
EGFR: 109 ML/MIN/1.73M2
GLUCOSE SERPL-MCNC: 98 MG/DL
POTASSIUM SERPL-SCNC: 5 MMOL/L
PROT SERPL-MCNC: 7.4 G/DL
SODIUM SERPL-SCNC: 140 MMOL/L
TSH SERPL-ACNC: 2.13 UIU/ML

## 2022-07-13 ENCOUNTER — OUTPATIENT (OUTPATIENT)
Dept: OUTPATIENT SERVICES | Facility: HOSPITAL | Age: 22
LOS: 1 days | End: 2022-07-13
Payer: MEDICAID

## 2022-07-13 ENCOUNTER — APPOINTMENT (OUTPATIENT)
Dept: MRI IMAGING | Facility: CLINIC | Age: 22
End: 2022-07-13

## 2022-07-13 DIAGNOSIS — Z00.8 ENCOUNTER FOR OTHER GENERAL EXAMINATION: ICD-10-CM

## 2022-07-13 DIAGNOSIS — R51.9 HEADACHE, UNSPECIFIED: ICD-10-CM

## 2022-07-13 DIAGNOSIS — Z78.9 OTHER SPECIFIED HEALTH STATUS: Chronic | ICD-10-CM

## 2022-07-13 PROCEDURE — 70551 MRI BRAIN STEM W/O DYE: CPT | Mod: 26

## 2022-07-13 PROCEDURE — 70551 MRI BRAIN STEM W/O DYE: CPT

## 2022-07-21 ENCOUNTER — APPOINTMENT (OUTPATIENT)
Dept: OTOLARYNGOLOGY | Facility: CLINIC | Age: 22
End: 2022-07-21

## 2022-07-21 VITALS
HEART RATE: 91 BPM | WEIGHT: 120 LBS | DIASTOLIC BLOOD PRESSURE: 69 MMHG | BODY MASS INDEX: 17.18 KG/M2 | SYSTOLIC BLOOD PRESSURE: 104 MMHG | HEIGHT: 70 IN

## 2022-07-21 DIAGNOSIS — Z77.122 CONTACT WITH AND (SUSPECTED) EXPOSURE TO NOISE: ICD-10-CM

## 2022-07-21 DIAGNOSIS — H61.22 IMPACTED CERUMEN, LEFT EAR: ICD-10-CM

## 2022-07-21 PROCEDURE — 99203 OFFICE O/P NEW LOW 30 MIN: CPT

## 2022-07-21 PROCEDURE — 92557 COMPREHENSIVE HEARING TEST: CPT

## 2022-07-21 PROCEDURE — 92567 TYMPANOMETRY: CPT
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
## 2022-07-21 NOTE — CONSULT LETTER
[Dear  ___] : Dear  [unfilled], [Consult Letter:] : I had the pleasure of evaluating your patient, [unfilled]. [Please see my note below.] : Please see my note below. [Consult Closing:] : Thank you very much for allowing me to participate in the care of this patient.  If you have any questions, please do not hesitate to contact me. [Sincerely,] : Sincerely, [FreeTextEntry3] : Felecia Madera MD\par Otolaryngology and Cranial Base Surgery\par Attending Physician - Department of Otolaryngology and Head & Neck Surgery\par [facility]\par \par [employer] [facility]

## 2022-07-21 NOTE — PROCEDURE
[FreeTextEntry3] : Cerumen impaction removed with forceps. After removal of cerumen canal noted to be normal without edema, purulence or inflammation. Patient tolerated procedure well.

## 2022-07-21 NOTE — ASSESSMENT
[FreeTextEntry1] : left acoustic trauma:\par - audio WNL\par - reassured patient that his acoustic trauma appears to have resolved without significant hearing loss\par - he continues to improve daily so recommend observation and expect he will have resolution of his symptoms over the next week or two\par - counseled on wearing hearing protection\par - f/u PRN

## 2022-08-02 ENCOUNTER — APPOINTMENT (OUTPATIENT)
Dept: NEUROLOGY | Facility: CLINIC | Age: 22
End: 2022-08-02

## 2022-08-02 VITALS — HEART RATE: 82 BPM | SYSTOLIC BLOOD PRESSURE: 106 MMHG | DIASTOLIC BLOOD PRESSURE: 74 MMHG

## 2022-08-02 PROCEDURE — 99213 OFFICE O/P EST LOW 20 MIN: CPT

## 2022-08-02 NOTE — ASSESSMENT
[FreeTextEntry1] : chronic headaches, failed trialed preventatives due to side effects. will trial magnesium 400mg and vitamin b2 400mg\par trial maxalt  prn as onset and not to wait. \par Keeping a diary has been discussed, including Apps- Migraine rainer \par \par \par Manage stress. Stress may trigger a migraine. Learn new ways to relax, such as\par deep breathing.\par Create a sleep schedule. Go to bed and get up at the same times each day. Do\par not watch television or go on electronics in bed. \par contact me if there are any changes in the quality or severity of the headaches.\par All questions answered, understanding verbalized.Patient in agreement with plan of care\par \par

## 2022-08-02 NOTE — PHYSICAL EXAM
[General Appearance - In No Acute Distress] : in no acute distress [Affect] : the affect was normal [Mood] : the mood was normal [Person] : oriented to person [Place] : oriented to place [Time] : oriented to time [Short Term Intact] : short term memory intact [Remote Intact] : remote memory intact [Concentration Intact] : normal concentrating ability [Naming Objects] : no difficulty naming common objects [Fluency] : fluency intact [Current Events] : adequate knowledge of current events [Past History] : adequate knowledge of personal past history [Cranial Nerves Optic (II)] : visual acuity intact bilaterally,  visual fields full to confrontation, pupils equal round and reactive to light [Cranial Nerves Oculomotor (III)] : extraocular motion intact [Cranial Nerves Trigeminal (V)] : facial sensation intact symmetrically [Cranial Nerves Facial (VII)] : face symmetrical [Cranial Nerves Vestibulocochlear (VIII)] : hearing was intact bilaterally [Cranial Nerves Glossopharyngeal (IX)] : tongue and palate midline [Cranial Nerves Accessory (XI - Cranial And Spinal)] : head turning and shoulder shrug symmetric [Cranial Nerves Hypoglossal (XII)] : there was no tongue deviation with protrusion [Motor Tone] : muscle tone was normal in all four extremities [Motor Strength] : muscle strength was normal in all four extremities [No Muscle Atrophy] : normal bulk in all four extremities [Sensation Tactile Decrease] : light touch was intact [Abnormal Walk] : normal gait [Balance] : balance was intact [2+] : Ankle jerk left 2+ [PERRL With Normal Accommodation] : pupils were equal in size, round, reactive to light, with normal accommodation [Extraocular Movements] : extraocular movements were intact [No Spinal Tenderness] : no spinal tenderness [Past-pointing] : there was no past-pointing [Tremor] : no tremor present [Plantar Reflex Right Only] : normal on the right [Plantar Reflex Left Only] : normal on the left

## 2022-08-02 NOTE — HISTORY OF PRESENT ILLNESS
[FreeTextEntry1] : **Interval 8.2.22: he stared nortriptyline for a week and stopped because it made him too sleepy \par headaches are still daily, same quality and severity. has not been taking anything for it.  did not start vitamin therapy.\par did a session of talk therapy with his mothers friend and stopped because "its not going to work" \par started new job last week as a .\par \par Previous:\par Patient presents today for headache followup. was previously seen by Dr. Daniels for headaches which he has tried Qulipta, Topamax, nortriptyline which was not effective  \par Location: frontal left frontal right,  temporal left and temporal right, one sided at times\par described as pressure/ squeezing. \par Timing of headache: varies\par Associated symptoms: photophobia from sun  if severe \par Pertinent negatives: phonophobia, dizziness, anosmia, blurred vision,visual aura, scotoma, memory impairment, neck stiffness, nausea\par Relived by:  rest,  \par Severity:  6/7\par Occurs; daily\par Duration:30 mins-days\par Sleeps:  6hrs\par Hydration: water:3 bottles   caffeine:rarely\par Triggers: unknown \par vaccinated x3, no covid \par feels has been more tired, hard to focus on things, easily frustrated\par \par \par \par \par Inital visit by Dr. James\par Mr. SONNY CHAPIN is a 21 year old man with pmhx of acne, headaches who presents for a follow up. He was recently found with elevated ck level to 22k. Was admitted received IV hydration. Last 2k. He complains of headaches, generalized weakness and fatigue. Discussed past blood work. Normal crp. His MRI of the head was declined. \par \par Pt here for headaches, nausea, and dizziness. Has been having headache since right after highs school, 2 years. Describes as throbbing as squeezing, all over, can be be any side, lasting for for a few minutes up, hours, to a few days on and off. Will also feel finger tingling, which started in the left hand first two fingers, now in the right, starting to have sensitivity to light which at times will bring on the headache. Doesn't take anything for the pain. Doesn't interrupt his activity, usually 6/10 average and 10/10 at worse rarely. No one in the family has headaches. Also had episodes of lightheadedness. Will come out of nowhere is standing or doing anywhere, will feel a intense pressure in his head and then vision will become blurred and then loses tone in his body. has never lost consciousness. Will hold onto something or sit down. Does not have SOB or palpitations, no nausea, sense of doom or rising sensation in the stomach. Has a sensation of dizziness which does not occur at the same time as headache, that the world is moving and can't think straight, no double vision, worse when laying down to sleep, lasts a few minutes, no nausea, occasional tinnitus but no hearing loss. No problems walking. Gets of frequently at night, 2-3 times, can usually get back to sleep within 5-10 minutes. No particular reason to why he wakes up, no rumination. 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 \par use to be very interested in [place] and has been losing interest because he feels the scents have changed and can't stand them. Two weeks ago his CPK was 22k. had no weakness, muscle pain, dark urine, no fever. Has been downtrending since then. Has two MRIs of the head that were reportedly normal. \par No family history of muscle disorders or weakness.

## 2022-09-13 ENCOUNTER — APPOINTMENT (OUTPATIENT)
Dept: NEUROLOGY | Facility: CLINIC | Age: 22
End: 2022-09-13

## 2022-09-13 VITALS
RESPIRATION RATE: 18 BRPM | TEMPERATURE: 98 F | SYSTOLIC BLOOD PRESSURE: 103 MMHG | WEIGHT: 116 LBS | DIASTOLIC BLOOD PRESSURE: 70 MMHG | HEART RATE: 85 BPM | HEIGHT: 70 IN | BODY MASS INDEX: 16.61 KG/M2 | OXYGEN SATURATION: 96 %

## 2022-09-13 DIAGNOSIS — R51.9 HEADACHE, UNSPECIFIED: ICD-10-CM

## 2022-09-13 PROCEDURE — 99213 OFFICE O/P EST LOW 20 MIN: CPT

## 2022-09-13 RX ORDER — TOPIRAMATE 25 MG/1
25 TABLET, FILM COATED ORAL TWICE DAILY
Qty: 60 | Refills: 1 | Status: DISCONTINUED | COMMUNITY
Start: 2022-05-02 | End: 2022-09-13

## 2022-09-13 RX ORDER — TRETINOIN 0.5 MG/G
0.05 CREAM TOPICAL
Qty: 1 | Refills: 6 | Status: DISCONTINUED | COMMUNITY
Start: 2021-10-09 | End: 2022-09-13

## 2022-09-13 RX ORDER — NORTRIPTYLINE HYDROCHLORIDE 10 MG/1
10 CAPSULE ORAL
Qty: 60 | Refills: 2 | Status: DISCONTINUED | COMMUNITY
Start: 2022-07-05 | End: 2022-09-13

## 2022-09-13 RX ORDER — ATOGEPANT 60 MG/1
60 TABLET ORAL
Qty: 60 | Refills: 3 | Status: DISCONTINUED | COMMUNITY
Start: 2022-05-04 | End: 2022-09-13

## 2022-09-13 NOTE — ASSESSMENT
[FreeTextEntry1] : chronic headaches, mild depression restarted Talk therapy. vitamin therapy with no gains. will try low dose gabapentin, side effects discussed.   \par  hold  maxalt  prn  \par continue talk therapy\par Create a sleep schedule. Go to bed and get up at the same times each day. Do\par not watch television or go on electronics in bed. \par contact me if there are any changes in the quality or severity of the headaches.\par All questions answered, understanding verbalized.Patient in agreement with plan of care\par \par

## 2022-09-13 NOTE — PHYSICAL EXAM
[General Appearance - Alert] : alert [Affect] : the affect was normal [Mood] : the mood was normal [Person] : oriented to person [Place] : oriented to place [Time] : oriented to time [Cranial Nerves Optic (II)] : visual acuity intact bilaterally,  visual fields full to confrontation, pupils equal round and reactive to light [Cranial Nerves Oculomotor (III)] : extraocular motion intact [Cranial Nerves Trigeminal (V)] : facial sensation intact symmetrically [Cranial Nerves Facial (VII)] : face symmetrical [Cranial Nerves Glossopharyngeal (IX)] : tongue and palate midline [Cranial Nerves Accessory (XI - Cranial And Spinal)] : head turning and shoulder shrug symmetric [Cranial Nerves Hypoglossal (XII)] : there was no tongue deviation with protrusion [Motor Strength] : muscle strength was normal in all four extremities [Motor Handedness Right-Handed] : the patient is right hand dominant [] : no respiratory distress [Involuntary Movements] : no involuntary movements were seen

## 2022-09-13 NOTE — HISTORY OF PRESENT ILLNESS
[FreeTextEntry1] : **Interval 9.13.22: started vitamin therapy, no changes in quality, daily >4hrs.  \par continues with daily stress with school, has less sexual desire. hd time falling asleep\par Has started talk therapy two weeks ago, likes the person and plans to continue. \par got  august 6th. \par studying premed at [employer]\par \par **Interval 8.2.22: he stared nortriptyline for a week and stopped because it made him too sleepy \par headaches are still daily, same quality and severity. has not been taking anything for it.  did not start vitamin therapy.\par did a session of talk therapy with his mothers friend and stopped because "its not going to work" \par \par Previous:\par Patient presents today for headache followup. was previously seen by Dr. Daniels for headaches which he has tried Qulipta, Topamax, nortriptyline which was not effective  \par Location: frontal left frontal right,  temporal left and temporal right, one sided at times\par described as pressure/ squeezing. \par Timing of headache: varies\par Associated symptoms: photophobia from sun  if severe \par Pertinent negatives: phonophobia, dizziness, anosmia, blurred vision,visual aura, scotoma, memory impairment, neck stiffness, nausea\par Relived by:  rest,  \par Severity:  6/7\par Occurs; daily\par Duration:30 mins-days\par Sleeps:  6hrs\par Hydration: water:3 bottles   caffeine:rarely\par Triggers: unknown \par vaccinated x3, no covid \par feels has been more tired, hard to focus on things, easily frustrated\par \par \par \par \par Inital visit by Dr. James\par Mr. SONNY CHAPIN is a 21 year old man with pmhx of acne, headaches who presents for a follow up. He was recently found with elevated ck level to 22k. Was admitted received IV hydration. Last 2k. He complains of headaches, generalized weakness and fatigue. Discussed past blood work. Normal crp. His MRI of the head was declined. \par \par Pt here for headaches, nausea, and dizziness. Has been having headache since right after highs school, 2 years. Describes as throbbing as squeezing, all over, can be be any side, lasting for for a few minutes up, hours, to a few days on and off. Will also feel finger tingling, which started in the left hand first two fingers, now in the right, starting to have sensitivity to light which at times will bring on the headache. Doesn't take anything for the pain. Doesn't interrupt his activity, usually 6/10 average and 10/10 at worse rarely. No one in the family has headaches. Also had episodes of lightheadedness. Will come out of nowhere is standing or doing anywhere, will feel a intense pressure in his head and then vision will become blurred and then loses tone in his body. has never lost consciousness. Will hold onto something or sit down. Does not have SOB or palpitations, no nausea, sense of doom or rising sensation in the stomach. Has a sensation of dizziness which does not occur at the same time as headache, that the world is moving and can't think straight, no double vision, worse when laying down to sleep, lasts a few minutes, no nausea, occasional tinnitus but no hearing loss. No problems walking. Gets of frequently at night, 2-3 times, can usually get back to sleep within 5-10 minutes. No particular reason to why he wakes up, no rumination. 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 \par use to be very interested in [place] and has been losing interest because he feels the scents have changed and can't stand them. Two weeks ago his CPK was 22k. had no weakness, muscle pain, dark urine, no fever. Has been downtrending since then. Has two MRIs of the head that were reportedly normal. \par No family history of muscle disorders or weakness.

## 2022-09-13 NOTE — REVIEW OF SYSTEMS
[Suicidal] : not suicidal [Sleep Disturbances] : sleep disturbances [Anxiety] : anxiety [Depression] : depression [Change In Personality] : no personality change [As Noted in HPI] : as noted in HPI [Negative] : Constitutional

## 2022-10-25 ENCOUNTER — APPOINTMENT (OUTPATIENT)
Dept: NEUROLOGY | Facility: CLINIC | Age: 22
End: 2022-10-25

## 2022-10-25 VITALS — DIASTOLIC BLOOD PRESSURE: 79 MMHG | SYSTOLIC BLOOD PRESSURE: 111 MMHG | OXYGEN SATURATION: 97 % | HEART RATE: 100 BPM

## 2022-10-25 DIAGNOSIS — R51.9 HEADACHE, UNSPECIFIED: ICD-10-CM

## 2022-10-25 PROCEDURE — 99213 OFFICE O/P EST LOW 20 MIN: CPT

## 2022-10-25 NOTE — ASSESSMENT
[FreeTextEntry1] : chronic headaches, mild depression continue Talk therapy. wants to retry gabapentin. stressed compliance to medication. \par Neuropsych/psychiatry \par Create a sleep schedule. Go to bed and get up at the same times each day. Do\par not watch television or go on electronics in bed. \par contact me if there are any changes in the quality or severity of the headaches.\par All questions answered, understanding verbalized.Patient in agreement with plan of care\par \par

## 2022-10-25 NOTE — HISTORY OF PRESENT ILLNESS
[FreeTextEntry1] : **interval 10.25.22: took gabapentin 1 tab at 8pm. couldn't’t sleep and was falling asleep at 3am.  tried for a month with no gains and stopped.  No changes in HA, same quality, 2x/week.\par continues with therapy, recc.  neurpsych testing \par \par **Interval 9.13.22: started vitamin therapy, no changes in quality, daily >4hrs.  \par continues with daily stress with school, has less sexual desire. hd time falling asleep\par Has started talk therapy two weeks ago, likes the person and plans to continue. \par got  august 6th. \par studying premed at [employer]\par \par **Interval 8.2.22: he stared nortriptyline for a week and stopped because it made him too sleepy \par headaches are still daily, same quality and severity. has not been taking anything for it.  did not start vitamin therapy.\par did a session of talk therapy with his mothers friend and stopped because "its not going to work" \par \par Previous:\par Patient presents today for headache followup. was previously seen by Dr. Daniels for headaches which he has tried Qulipta, Topamax, nortriptyline which was not effective  \par Location: frontal left frontal right,  temporal left and temporal right, one sided at times\par described as pressure/ squeezing. \par Timing of headache: varies\par Associated symptoms: photophobia from sun  if severe \par Pertinent negatives: phonophobia, dizziness, anosmia, blurred vision,visual aura, scotoma, memory impairment, neck stiffness, nausea\par Relived by:  rest,  \par Severity:  6/7\par Occurs; daily\par Duration:30 mins-days\par Sleeps:  6hrs\par Hydration: water:3 bottles   caffeine:rarely\par Triggers: unknown \par vaccinated x3, no covid \par feels has been more tired, hard to focus on things, easily frustrated\par \par \par \par \par Inital visit by Dr. James\par Mr. SONNY CHAPIN is a 21 year old man with pmhx of acne, headaches who presents for a follow up. He was recently found with elevated ck level to 22k. Was admitted received IV hydration. Last 2k. He complains of headaches, generalized weakness and fatigue. Discussed past blood work. Normal crp. His MRI of the head was declined. \par \par Pt here for headaches, nausea, and dizziness. Has been having headache since right after highs school, 2 years. Describes as throbbing as squeezing, all over, can be be any side, lasting for for a few minutes up, hours, to a few days on and off. Will also feel finger tingling, which started in the left hand first two fingers, now in the right, starting to have sensitivity to light which at times will bring on the headache. Doesn't take anything for the pain. Doesn't interrupt his activity, usually 6/10 average and 10/10 at worse rarely. No one in the family has headaches. Also had episodes of lightheadedness. Will come out of nowhere is standing or doing anywhere, will feel a intense pressure in his head and then vision will become blurred and then loses tone in his body. has never lost consciousness. Will hold onto something or sit down. Does not have SOB or palpitations, no nausea, sense of doom or rising sensation in the stomach. Has a sensation of dizziness which does not occur at the same time as headache, that the world is moving and can't think straight, no double vision, worse when laying down to sleep, lasts a few minutes, no nausea, occasional tinnitus but no hearing loss. No problems walking. Gets of frequently at night, 2-3 times, can usually get back to sleep within 5-10 minutes. No particular reason to why he wakes up, no rumination. 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 \par use to be very interested in [employer] [place] and has been losing interest because he feels the scents have changed and can't stand them. Two weeks ago his CPK was 22k. had no weakness, muscle pain, dark urine, no fever. Has been downtrending since then. Has two MRIs of the head that were reportedly normal. \par No family history of muscle disorders or weakness.

## 2022-10-25 NOTE — PHYSICAL EXAM
[Person] : oriented to person [Place] : oriented to place [Time] : oriented to time [Cranial Nerves Optic (II)] : visual acuity intact bilaterally,  visual fields full to confrontation, pupils equal round and reactive to light [Cranial Nerves Oculomotor (III)] : extraocular motion intact [Cranial Nerves Trigeminal (V)] : facial sensation intact symmetrically [Cranial Nerves Facial (VII)] : face symmetrical [Cranial Nerves Accessory (XI - Cranial And Spinal)] : head turning and shoulder shrug symmetric [Cranial Nerves Glossopharyngeal (IX)] : tongue and palate midline [Cranial Nerves Hypoglossal (XII)] : there was no tongue deviation with protrusion [Motor Strength] : muscle strength was normal in all four extremities [Motor Handedness Right-Handed] : the patient is right hand dominant [General Appearance - In No Acute Distress] : in no acute distress [Affect] : the affect was normal [Mood] : the mood was normal

## 2022-11-14 DIAGNOSIS — Z11.1 ENCOUNTER FOR SCREENING FOR RESPIRATORY TUBERCULOSIS: ICD-10-CM

## 2022-12-06 ENCOUNTER — APPOINTMENT (OUTPATIENT)
Dept: NEUROLOGY | Facility: CLINIC | Age: 22
End: 2022-12-06

## 2022-12-17 ENCOUNTER — EMERGENCY (EMERGENCY)
Facility: HOSPITAL | Age: 22
LOS: 1 days | Discharge: HOME | End: 2022-12-17
Attending: EMERGENCY MEDICINE
Payer: MEDICAID

## 2022-12-17 VITALS
HEIGHT: 63 IN | RESPIRATION RATE: 18 BRPM | WEIGHT: 119.93 LBS | HEART RATE: 73 BPM | OXYGEN SATURATION: 97 % | SYSTOLIC BLOOD PRESSURE: 105 MMHG | TEMPERATURE: 98 F | DIASTOLIC BLOOD PRESSURE: 70 MMHG

## 2022-12-17 DIAGNOSIS — Z78.9 OTHER SPECIFIED HEALTH STATUS: Chronic | ICD-10-CM

## 2022-12-17 PROCEDURE — 82550 ASSAY OF CK (CPK): CPT

## 2022-12-17 PROCEDURE — 82248 BILIRUBIN DIRECT: CPT

## 2022-12-17 PROCEDURE — 36415 COLL VENOUS BLD VENIPUNCTURE: CPT

## 2022-12-17 PROCEDURE — 96374 THER/PROPH/DIAG INJ IV PUSH: CPT

## 2022-12-17 PROCEDURE — 99284 EMERGENCY DEPT VISIT MOD MDM: CPT

## 2022-12-17 PROCEDURE — 99284 EMERGENCY DEPT VISIT MOD MDM: CPT | Mod: 25

## 2022-12-17 PROCEDURE — 96375 TX/PRO/DX INJ NEW DRUG ADDON: CPT

## 2022-12-17 PROCEDURE — 80053 COMPREHEN METABOLIC PANEL: CPT

## 2022-12-17 PROCEDURE — 86790 VIRUS ANTIBODY NOS: CPT

## 2022-12-17 PROCEDURE — 85025 COMPLETE CBC W/AUTO DIFF WBC: CPT

## 2022-12-17 PROCEDURE — 87799 DETECT AGENT NOS DNA QUANT: CPT

## 2022-12-17 RX ORDER — SODIUM CHLORIDE 9 MG/ML
1000 INJECTION INTRAMUSCULAR; INTRAVENOUS; SUBCUTANEOUS ONCE
Refills: 0 | Status: COMPLETED | OUTPATIENT
Start: 2022-12-17 | End: 2022-12-17

## 2022-12-17 RX ORDER — METOCLOPRAMIDE HCL 10 MG
10 TABLET ORAL ONCE
Refills: 0 | Status: COMPLETED | OUTPATIENT
Start: 2022-12-17 | End: 2022-12-17

## 2022-12-17 RX ORDER — ACETAMINOPHEN 500 MG
1000 TABLET ORAL ONCE
Refills: 0 | Status: COMPLETED | OUTPATIENT
Start: 2022-12-17 | End: 2022-12-17

## 2022-12-17 RX ORDER — KETOROLAC TROMETHAMINE 30 MG/ML
15 SYRINGE (ML) INJECTION ONCE
Refills: 0 | Status: DISCONTINUED | OUTPATIENT
Start: 2022-12-17 | End: 2022-12-17

## 2022-12-17 RX ADMIN — Medication 1000 MILLIGRAM(S): at 22:53

## 2022-12-17 RX ADMIN — Medication 15 MILLIGRAM(S): at 22:53

## 2022-12-17 RX ADMIN — Medication 400 MILLIGRAM(S): at 22:46

## 2022-12-17 RX ADMIN — Medication 10 MILLIGRAM(S): at 22:45

## 2022-12-17 RX ADMIN — SODIUM CHLORIDE 1000 MILLILITER(S): 9 INJECTION INTRAMUSCULAR; INTRAVENOUS; SUBCUTANEOUS at 22:44

## 2022-12-17 RX ADMIN — Medication 15 MILLIGRAM(S): at 22:45

## 2022-12-17 NOTE — ED PROVIDER NOTE - PHYSICAL EXAMINATION
NAD. VSS. Afebrile. +PERRL, EOMI. No sinus or temporal tender. Neck supple. Lungs clear. No spinal tender. ABD soft, non tender. No cva tender. Neuro- intact.

## 2022-12-17 NOTE — ED PROVIDER NOTE - NSFOLLOWUPINSTRUCTIONS_ED_ALL_ED_FT
Hydrate.    Take Ibuprofen (600mg every 8hours with food) or/and Tylenol (2 tablets of 500mg every 8hours) as needed for pain.    Follow up with your neurologist for reevaluation, call Monday for appointment in 2days.    Return for any concerns, fever, vomiting, weakness, or worsening pain. Hydrate.    Take Ibuprofen (600mg every 8hours with food) or/and Tylenol (2 tablets of 500mg every 8hours) as needed for pain.    Follow up with your neurologist for reevaluation, call Monday for appointment in 2days.    Follow up with your primary Dr. for LFT results (elevated T-bilirubin in ED).    Return for any concerns, fever, vomiting, weakness, or worsening pain.

## 2022-12-17 NOTE — ED PROVIDER NOTE - OBJECTIVE STATEMENT
22y M w/ PMHx of constant HA (2019) presents to the ED c/o worsening HA, generalized weakness, photophobia, dizziness, nausea starting last night. Rates the pain an 8 out of 10. Describes the pain as a throbbing, pulsing pain radiating to jaw and R side of eye. Pt saw neurologist for HA, currently on Gabapentin w/o relief. Pt had multiple tests that were normal. Pt states HA is like typical previous HA but worse. Denies previous botox. Denies vomiting, numbness, tingling, weakness, F/C, neck pain, blurred vision, congestion, cough, abd pain. 22y M w/ PMHx of constant HA (since 2019, on f/u with neurologist, CT/MRI-unremarkable) presents to the ED c/o worsening HA, generalized weakness, photophobia, dizziness, nausea starting last night. Rates the pain an 8 out of 10. Describes the pain as a throbbing, pulsing pain radiating to jaw and R side of eye. Pt saw neurologist for HA, currently on Gabapentin w/o relief. Pt had multiple tests that were normal. Pt states HA is like typical previous HA but worse. Denies previous botox. Denies vomiting, numbness, tingling, weakness, F/C, neck pain, blurred vision, congestion, cough, abd pain.

## 2022-12-17 NOTE — ED ADULT NURSE NOTE - OBJECTIVE STATEMENT
Patient presented to ED complaining of worsening Headache today. Patient with a History of migranes follows with a neurolist who prescribed gabepentin. Patient states the medication is not helping. Patient medicated as ordered. No distress noted at this time.

## 2022-12-17 NOTE — ED PROVIDER NOTE - PROGRESS NOTE DETAILS
Pt states feeling much better now. Neuro- intact. Pt states feeling much better now. Neuro- intact. Pt was informed of elevated bilirubin and will outpt f/u with PMD. PMD recommended LFT/Hep E blood work on his last visit and will obtain these test in ED and will f/u with PMD results.

## 2022-12-17 NOTE — ED PROVIDER NOTE - PATIENT PORTAL LINK FT
You can access the FollowMyHealth Patient Portal offered by [facility] by registering at the following website: [email or web address]. By joining [facility]’s FollowMyHealth portal, you will also be able to view your health information using other applications (apps) compatible with our system.

## 2022-12-17 NOTE — ED PROVIDER NOTE - ATTENDING APP SHARED VISIT CONTRIBUTION OF CARE
Chidi Jade MD:  I personally saw the patient and performed a substantive portion of the visit including all aspects of the medical decision making.    MDM: 22-year-old male with history of headache, who presents with worsening severity of headache, associated with generalized weakness, photophobia, dizziness, nausea without vomiting.  Patient states that this is similar to his previous headaches which has been present since 2019 and had work-up with neurologist including unremarkable CT and MRI, last imaging performed last year.  Patient reports that currently his headache is worse on the right side including the right.  Patient denies any vision changes, numbness, weakness, vomiting, fevers, chills, neck stiffness,  trauma/injury.  On examination, eyes are PERRLA, EOMI without diplopia or discomfort.  No nuchal rigidity with no signs of meningismus.  NEURO exam shows AAOx3, Cranial nerves III-XII intact. Strength intact with 5/5 strength in all 4 extremities. Sensation intact to light touch in all 4 extremities. No pronator drift. No dysmetria with finger-nose-finger. Normal gait without ataxia. Normal balance and speech.  Patient reports gradual-onset headache that is similar to prior headache, just more severe.  There was no sudden onset or maximal at onset symptoms. No red flags including neck pain/stiffness/rigidity, LOC, exertional-component, fevers/chills, vision changes or neurological complaints of numbness/weakness. Normal neuro exam. Not consistent with presentation for ICH, SAH or temporal arteritis. Likely a benign cause of headache, no urgent imaging indicated especially since patient has been worked up by his neurologist with imaging with CT and MRI already.  Shared decision making to forego CT imaging in the ED given radiation and prior work-up, and patient was recommended to follow-up closely with neurologist for possible repeat MRI and further evaluation and treatment for his headache.      Patient reassessed and has improved symptoms. Repeat neuro exam is benign without any neuro deficits. Ambulatory in the ED without ataxia or assistance.  Stable for discharge with close follow-up and strict return precautions. The patient has been informed of all concerning signs and symptoms to return to Emergency Department, the necessity to follow up with PMD within 2-3 days and neurologist in 4 to 5 days was explained, and the patient reports understanding of above with capacity and insight.  Recommended to monitor for warning signs that would warrant immediate evaluation from a healthcare professional.

## 2022-12-17 NOTE — ED ADULT TRIAGE NOTE - AS TEMP SITE
[FreeTextEntry1] : With respect to his urination he tells me he’s doing okay except occasionally it slow most of the time it’s okay and I’d rather not add in other medications while where taking in an off label use one. The question is do we increase the Clomid a do we wait and see if this was in a barren and given the concept of first do no harm think I’m to leave them on the Arimidex for another month before we decide to raise the dose and/or shorten the interval. I’m going to give him enough to last in for two months, he will get blood drawn in 4 to 6 weeks and see me at eight. If he is doing well will leave them on that if he’s not doing well enough will consider changes.\par \par Finally I reviewed with him that the pharmacy is not going to call him and say hey have you used up the tri-mix he ready for more however if he calls the pharmacy they have a script. If he does not get to them before the end of December and he decides he wants him in January he will let us know we will send in a new script in. He tells me he remembers how to do the injections and is prepared for them.\par 
oral

## 2022-12-17 NOTE — ED ADULT TRIAGE NOTE - CHIEF COMPLAINT QUOTE
hx HA, was taking gabapentin prescribed by neurologist but stopped w/o improvement  worsening headache since last night

## 2022-12-18 VITALS
HEART RATE: 80 BPM | TEMPERATURE: 98 F | OXYGEN SATURATION: 100 % | RESPIRATION RATE: 17 BRPM | SYSTOLIC BLOOD PRESSURE: 115 MMHG | DIASTOLIC BLOOD PRESSURE: 75 MMHG

## 2022-12-18 LAB
ALBUMIN SERPL ELPH-MCNC: 4.7 G/DL — SIGNIFICANT CHANGE UP (ref 3.3–5)
ALP SERPL-CCNC: 77 U/L — SIGNIFICANT CHANGE UP (ref 40–120)
ALT FLD-CCNC: 15 U/L — SIGNIFICANT CHANGE UP (ref 10–45)
ANION GAP SERPL CALC-SCNC: 9 MMOL/L — SIGNIFICANT CHANGE UP (ref 5–17)
AST SERPL-CCNC: 20 U/L — SIGNIFICANT CHANGE UP (ref 10–40)
BASOPHILS # BLD AUTO: 0.04 K/UL — SIGNIFICANT CHANGE UP (ref 0–0.2)
BASOPHILS NFR BLD AUTO: 0.7 % — SIGNIFICANT CHANGE UP (ref 0–2)
BILIRUB DIRECT SERPL-MCNC: 0.2 MG/DL — SIGNIFICANT CHANGE UP (ref 0–0.3)
BILIRUB INDIRECT FLD-MCNC: 1.5 MG/DL — HIGH (ref 0.2–1)
BILIRUB SERPL-MCNC: 1.7 MG/DL — HIGH (ref 0.2–1.2)
BUN SERPL-MCNC: 11 MG/DL — SIGNIFICANT CHANGE UP (ref 7–23)
CALCIUM SERPL-MCNC: 9.6 MG/DL — SIGNIFICANT CHANGE UP (ref 8.4–10.5)
CHLORIDE SERPL-SCNC: 102 MMOL/L — SIGNIFICANT CHANGE UP (ref 96–108)
CK SERPL-CCNC: 127 U/L — SIGNIFICANT CHANGE UP (ref 30–200)
CO2 SERPL-SCNC: 28 MMOL/L — SIGNIFICANT CHANGE UP (ref 22–31)
CREAT SERPL-MCNC: 1.03 MG/DL — SIGNIFICANT CHANGE UP (ref 0.5–1.3)
EGFR: 106 ML/MIN/1.73M2 — SIGNIFICANT CHANGE UP
EOSINOPHIL # BLD AUTO: 0.07 K/UL — SIGNIFICANT CHANGE UP (ref 0–0.5)
EOSINOPHIL NFR BLD AUTO: 1.1 % — SIGNIFICANT CHANGE UP (ref 0–6)
GLUCOSE SERPL-MCNC: 90 MG/DL — SIGNIFICANT CHANGE UP (ref 70–99)
HCT VFR BLD CALC: 39.3 % — SIGNIFICANT CHANGE UP (ref 39–50)
HGB BLD-MCNC: 13.2 G/DL — SIGNIFICANT CHANGE UP (ref 13–17)
IMM GRANULOCYTES NFR BLD AUTO: 0.2 % — SIGNIFICANT CHANGE UP (ref 0–0.9)
LYMPHOCYTES # BLD AUTO: 3.43 K/UL — HIGH (ref 1–3.3)
LYMPHOCYTES # BLD AUTO: 56.2 % — HIGH (ref 13–44)
MCHC RBC-ENTMCNC: 29.8 PG — SIGNIFICANT CHANGE UP (ref 27–34)
MCHC RBC-ENTMCNC: 33.6 GM/DL — SIGNIFICANT CHANGE UP (ref 32–36)
MCV RBC AUTO: 88.7 FL — SIGNIFICANT CHANGE UP (ref 80–100)
MONOCYTES # BLD AUTO: 0.54 K/UL — SIGNIFICANT CHANGE UP (ref 0–0.9)
MONOCYTES NFR BLD AUTO: 8.9 % — SIGNIFICANT CHANGE UP (ref 2–14)
NEUTROPHILS # BLD AUTO: 2.01 K/UL — SIGNIFICANT CHANGE UP (ref 1.8–7.4)
NEUTROPHILS NFR BLD AUTO: 32.9 % — LOW (ref 43–77)
NRBC # BLD: 0 /100 WBCS — SIGNIFICANT CHANGE UP (ref 0–0)
PLATELET # BLD AUTO: 172 K/UL — SIGNIFICANT CHANGE UP (ref 150–400)
POTASSIUM SERPL-MCNC: 4.3 MMOL/L — SIGNIFICANT CHANGE UP (ref 3.5–5.3)
POTASSIUM SERPL-SCNC: 4.3 MMOL/L — SIGNIFICANT CHANGE UP (ref 3.5–5.3)
PROT SERPL-MCNC: 7.2 G/DL — SIGNIFICANT CHANGE UP (ref 6–8.3)
RBC # BLD: 4.43 M/UL — SIGNIFICANT CHANGE UP (ref 4.2–5.8)
RBC # FLD: 12.8 % — SIGNIFICANT CHANGE UP (ref 10.3–14.5)
SODIUM SERPL-SCNC: 139 MMOL/L — SIGNIFICANT CHANGE UP (ref 135–145)
WBC # BLD: 6.1 K/UL — SIGNIFICANT CHANGE UP (ref 3.8–10.5)
WBC # FLD AUTO: 6.1 K/UL — SIGNIFICANT CHANGE UP (ref 3.8–10.5)

## 2022-12-19 ENCOUNTER — NON-APPOINTMENT (OUTPATIENT)
Age: 22
End: 2022-12-19

## 2022-12-23 LAB
ANNOTATION COMMENT IMP: SIGNIFICANT CHANGE UP
HEV IGM SER QL: SIGNIFICANT CHANGE UP
HEV RNA SERPL NAA+PROBE-ACNC: SIGNIFICANT CHANGE UP IU/ML
HEV RNA SERPL NAA+PROBE-LOG IU: <3.3 — SIGNIFICANT CHANGE UP
SPECIMEN SOURCE: SIGNIFICANT CHANGE UP

## 2023-07-05 ENCOUNTER — NON-APPOINTMENT (OUTPATIENT)
Age: 23
End: 2023-07-05

## 2024-01-29 ENCOUNTER — APPOINTMENT (OUTPATIENT)
Dept: INTERNAL MEDICINE | Facility: CLINIC | Age: 24
End: 2024-01-29
Payer: MEDICAID

## 2024-01-29 VITALS
OXYGEN SATURATION: 99 % | HEIGHT: 70 IN | DIASTOLIC BLOOD PRESSURE: 80 MMHG | TEMPERATURE: 98.1 F | WEIGHT: 127 LBS | SYSTOLIC BLOOD PRESSURE: 118 MMHG | HEART RATE: 80 BPM | BODY MASS INDEX: 18.18 KG/M2

## 2024-01-29 DIAGNOSIS — Z00.00 ENCOUNTER FOR GENERAL ADULT MEDICAL EXAMINATION W/OUT ABNORMAL FINDINGS: ICD-10-CM

## 2024-01-29 DIAGNOSIS — G47.00 INSOMNIA, UNSPECIFIED: ICD-10-CM

## 2024-01-29 DIAGNOSIS — R09.81 NASAL CONGESTION: ICD-10-CM

## 2024-01-29 PROCEDURE — 99395 PREV VISIT EST AGE 18-39: CPT | Mod: 25

## 2024-01-29 RX ORDER — RIBOFLAVIN (VITAMIN B2) 400 MG
400 TABLET ORAL
Qty: 30 | Refills: 3 | Status: DISCONTINUED | COMMUNITY
Start: 2022-08-02 | End: 2024-01-29

## 2024-01-29 RX ORDER — AZELASTINE HYDROCHLORIDE 137 UG/1
137 SPRAY, METERED NASAL
Qty: 1 | Refills: 2 | Status: ACTIVE | COMMUNITY
Start: 2024-01-29 | End: 1900-01-01

## 2024-01-29 RX ORDER — GABAPENTIN 100 MG/1
100 CAPSULE ORAL
Qty: 90 | Refills: 3 | Status: DISCONTINUED | COMMUNITY
Start: 2022-09-13 | End: 2024-01-29

## 2024-01-29 RX ORDER — CHLORHEXIDINE GLUCONATE 4 %
250 LIQUID (ML) TOPICAL
Qty: 30 | Refills: 3 | Status: DISCONTINUED | COMMUNITY
Start: 2022-08-02 | End: 2024-01-29

## 2024-02-05 NOTE — HISTORY OF PRESENT ILLNESS
[FreeTextEntry1] : CPE [de-identified] : Mr. CHAPIN is a 23 year old male that presents to the office for a CPE.  Pmhx of acne and microscopic hematuria No current medications No further migraines, not using triptan - uses tylenol PRN Pt reports some intermittent, mild bilateral ?hand/finger numbness Pt reports lack of appetite - 2-3 meals/days Exercises daily, running No snoring - sleeping 6-7 hours/night Last meal around 8PM, bedtime around 11-12 Nasal congestion  Melatonin did not work well Takes B6, B12, folic acid Repeat labs today Upcoming eye doctor appointment He was in [facility] in 09/21, he saw a neurologist for this and was told everything was fine. He had head imaging at that time. He saw Physical therapy in the past.  Advised GI f/up 118/80 127lb  -Flu - Declined -COVID19 - UTD -TDAP/MMR - 2021

## 2024-02-05 NOTE — PLAN
[FreeTextEntry1] : Sleep medicine for insomnia States melatonin didn't help Discussed sleep hygiene Pt is active - exercising Pt states lack of appetite - he is eating 2-3 meals/day GI referral provided  Advised neuro f/up Labs as above Azelastine for nasal congestion

## 2024-02-05 NOTE — HEALTH RISK ASSESSMENT
[Very Good] : ~his/her~  mood as very good [No] : In the past 12 months have you used drugs other than those required for medical reasons? No [No falls in past year] : Patient reported no falls in the past year [0] : 2) Feeling down, depressed, or hopeless: Not at all (0) [PHQ-2 Negative - No further assessment needed] : PHQ-2 Negative - No further assessment needed [None] : None [Fully functional (bathing, dressing, toileting, transferring, walking, feeding)] : Fully functional (bathing, dressing, toileting, transferring, walking, feeding) [Fully functional (using the telephone, shopping, preparing meals, housekeeping, doing laundry, using] : Fully functional and needs no help or supervision to perform IADLs (using the telephone, shopping, preparing meals, housekeeping, doing laundry, using transportation, managing medications and managing finances) [Smoke Detector] : smoke detector [Seat Belt] :  uses seat belt [Patient/Caregiver not ready to engage] : , patient/caregiver not ready to engage [Student] : student [College] : College [de-identified] : walking [de-identified] : balanced [[id number]] : 0 [de-identified] : CS

## 2024-02-21 ENCOUNTER — APPOINTMENT (OUTPATIENT)
Dept: INTERNAL MEDICINE | Facility: CLINIC | Age: 24
End: 2024-02-21
Payer: MEDICAID

## 2024-02-21 DIAGNOSIS — M54.2 CERVICALGIA: ICD-10-CM
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
## 2024-02-21 PROCEDURE — 99443: CPT

## 2024-02-21 RX ORDER — TIZANIDINE 2 MG/1
2 TABLET ORAL
Qty: 30 | Refills: 0 | Status: ACTIVE | COMMUNITY
Start: 2024-02-21 | End: 1900-01-01

## 2024-02-22 ENCOUNTER — APPOINTMENT (OUTPATIENT)
Dept: OPHTHALMOLOGY | Facility: CLINIC | Age: 24
End: 2024-02-22
Payer: MEDICAID

## 2024-02-22 ENCOUNTER — NON-APPOINTMENT (OUTPATIENT)
Age: 24
End: 2024-02-22

## 2024-02-22 PROCEDURE — 92015 DETERMINE REFRACTIVE STATE: CPT | Mod: NC

## 2024-02-22 PROCEDURE — 92014 COMPRE OPH EXAM EST PT 1/>: CPT

## 2024-02-26 ENCOUNTER — NON-APPOINTMENT (OUTPATIENT)
Age: 24
End: 2024-02-26

## 2024-02-26 ENCOUNTER — APPOINTMENT (OUTPATIENT)
Dept: ORTHOPEDIC SURGERY | Facility: CLINIC | Age: 24
End: 2024-02-26
Payer: MEDICAID

## 2024-02-26 VITALS
TEMPERATURE: 97.4 F | WEIGHT: 125 LBS | HEART RATE: 75 BPM | DIASTOLIC BLOOD PRESSURE: 78 MMHG | SYSTOLIC BLOOD PRESSURE: 116 MMHG | BODY MASS INDEX: 17.9 KG/M2 | HEIGHT: 70 IN

## 2024-02-26 DIAGNOSIS — M54.50 LOW BACK PAIN, UNSPECIFIED: ICD-10-CM

## 2024-02-26 PROCEDURE — 72082 X-RAY EXAM ENTIRE SPI 2/3 VW: CPT

## 2024-02-26 PROCEDURE — 99203 OFFICE O/P NEW LOW 30 MIN: CPT
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
## 2024-02-26 NOTE — DISCUSSION/SUMMARY
[de-identified] : We discussed further treatment options.  He will try course of physical therapy.  Follow-up in 4 weeks time.  MRIs if not improved or worsen.

## 2024-02-26 NOTE — PHYSICAL EXAM
[Antalgic] : not antalgic [Ataxic] : not ataxic [de-identified] : Examination of the cervical spine reveals no midline or paraspinal tenderness to palpation. No cervical lymphadenopathy. Decreased range of motion with respect to flexion, extension, rotation, and lateral bending. Negative Spurlings. Negative Lhermitte's. Full range of motion bilateral shoulders without evidence of impingement. No instability of bilateral upper extremities.  Cranial nerves II through XII grossly intact. Intact sensation bilateral upper extremities. 5/5 deltoids biceps triceps wrist extensors wrist flexors finger flexors and hand intrinsics. 1+ biceps triceps and brachioradialis reflexes. Negative Keller's. 2+ radial pulse. Negative Tinel's over the cubital and carpal tunnel. No skin lesions on the right and left upper extremities.  Examination of the thoracic and lumbar spine reveals no midline tenderness palpation, step-offs, or skin lesions. Decreased range of motion with respect to flexion, extension, lateral bending, and rotation. No tenderness to palpation of the sciatic notch. No tenderness palpation of the bilateral greater trochanters. No pain with passive internal/external rotation of the hips. No instability of bilateral lower extremities.  Negative TE. Negative straight leg raise bilaterally. No bowstring. Negative femoral stretch. 5 out of 5 iliopsoas, hip abductors, hips adductors, quadriceps, hamstrings, gastrocsoleus, tibialis anterior, extensor hallucis longus, peroneals. Grossly intact sensation to light touch bilateral lower extremities. 1+ patellar and Achilles reflexes. Downgoing Babinski. No clonus. Intact proprioception. Palpable pulses. No skin lesion and no edema on the right and left lower extremities. [de-identified] : AP lateral scoliosis x-rays reveals mild scoliosis without aggressive lesions

## 2024-03-13 ENCOUNTER — APPOINTMENT (OUTPATIENT)
Dept: DERMATOLOGY | Facility: CLINIC | Age: 24
End: 2024-03-13
Payer: MEDICAID

## 2024-03-13 DIAGNOSIS — L21.9 SEBORRHEIC DERMATITIS, UNSPECIFIED: ICD-10-CM

## 2024-03-13 DIAGNOSIS — L70.9 ACNE, UNSPECIFIED: ICD-10-CM

## 2024-03-13 PROCEDURE — 99214 OFFICE O/P EST MOD 30 MIN: CPT

## 2024-03-13 RX ORDER — TRETINOIN 1 MG/G
0.1 CREAM TOPICAL
Qty: 1 | Refills: 3 | Status: ACTIVE | COMMUNITY
Start: 2024-03-13 | End: 1900-01-01

## 2024-03-13 RX ORDER — KETOCONAZOLE 20.5 MG/ML
2 SHAMPOO, SUSPENSION TOPICAL
Qty: 1 | Refills: 11 | Status: ACTIVE | COMMUNITY
Start: 2024-03-13 | End: 1900-01-01

## 2024-03-13 RX ORDER — DOXYCYCLINE HYCLATE 100 MG/1
100 CAPSULE ORAL
Qty: 28 | Refills: 0 | Status: ACTIVE | COMMUNITY
Start: 2024-03-13 | End: 1900-01-01

## 2024-03-18 ENCOUNTER — TRANSCRIPTION ENCOUNTER (OUTPATIENT)
Age: 24
End: 2024-03-18

## 2024-03-18 RX ORDER — TAZAROTENE 1 MG/G
0.1 CREAM TOPICAL
Qty: 1 | Refills: 3 | Status: ACTIVE | COMMUNITY
Start: 2024-03-13

## 2024-03-30 NOTE — ED ADULT NURSE NOTE - NSSUHOSCREENINGYN_ED_ALL_ED
Yes - the patient is able to be screened
no abdominal distension/no blood in stool/no burning urination/no chills/no dysuria/no fever/no hematuria/no nausea/no vomiting

## 2024-04-05 ENCOUNTER — APPOINTMENT (OUTPATIENT)
Dept: GASTROENTEROLOGY | Facility: CLINIC | Age: 24
End: 2024-04-05
Payer: MEDICAID

## 2024-04-05 VITALS
HEIGHT: 70 IN | OXYGEN SATURATION: 99 % | DIASTOLIC BLOOD PRESSURE: 60 MMHG | BODY MASS INDEX: 17.9 KG/M2 | HEART RATE: 90 BPM | WEIGHT: 125 LBS | TEMPERATURE: 96.8 F | SYSTOLIC BLOOD PRESSURE: 100 MMHG

## 2024-04-05 DIAGNOSIS — R11.0 NAUSEA: ICD-10-CM

## 2024-04-05 DIAGNOSIS — R12 HEARTBURN: ICD-10-CM

## 2024-04-05 PROCEDURE — 99205 OFFICE O/P NEW HI 60 MIN: CPT

## 2024-04-05 RX ORDER — MULTIVITAMIN
TABLET ORAL
Refills: 0 | Status: ACTIVE | COMMUNITY

## 2024-04-05 RX ORDER — RIZATRIPTAN BENZOATE 10 MG/1
10 TABLET, ORALLY DISINTEGRATING ORAL
Qty: 8 | Refills: 5 | Status: DISCONTINUED | COMMUNITY
Start: 2022-08-02 | End: 2024-04-05

## 2024-04-09 LAB
ENDOMYSIUM IGA SER QL: NEGATIVE
ENDOMYSIUM IGA TITR SER: NORMAL
GLIADIN IGA SER QL: <5 UNITS
GLIADIN IGG SER QL: <5 UNITS
GLIADIN PEPTIDE IGA SER-ACNC: NEGATIVE
GLIADIN PEPTIDE IGG SER-ACNC: NEGATIVE
IGA SER QL IEP: 168 MG/DL
TTG IGA SER IA-ACNC: <1.2 U/ML
TTG IGA SER-ACNC: NEGATIVE
TTG IGG SER IA-ACNC: 3.4 U/ML
TTG IGG SER IA-ACNC: NEGATIVE

## 2024-04-19 ENCOUNTER — NON-APPOINTMENT (OUTPATIENT)
Age: 24
End: 2024-04-19

## 2024-04-22 ENCOUNTER — APPOINTMENT (OUTPATIENT)
Dept: ORTHOPEDIC SURGERY | Facility: CLINIC | Age: 24
End: 2024-04-22
Payer: MEDICAID

## 2024-04-22 VITALS — HEIGHT: 70 IN | BODY MASS INDEX: 17.9 KG/M2 | WEIGHT: 125 LBS

## 2024-04-22 DIAGNOSIS — M54.2 CERVICALGIA: ICD-10-CM

## 2024-04-22 DIAGNOSIS — M41.9 SCOLIOSIS, UNSPECIFIED: ICD-10-CM

## 2024-04-22 PROCEDURE — 99214 OFFICE O/P EST MOD 30 MIN: CPT

## 2024-04-22 NOTE — ED ADULT TRIAGE NOTE - SPO2 (%)
Received referral for Ambulatory Care Management. Patient will be outreached for CM engagement.   
97

## 2024-05-05 LAB
25(OH)D3 SERPL-MCNC: 25.5 NG/ML
ALBUMIN SERPL ELPH-MCNC: 5.1 G/DL
ALP BLD-CCNC: 81 U/L
ALT SERPL-CCNC: 23 U/L
ANA PAT FLD IF-IMP: ABNORMAL
ANA SER IF-ACNC: ABNORMAL
ANION GAP SERPL CALC-SCNC: 12 MMOL/L
APPEARANCE: CLEAR
AST SERPL-CCNC: 21 U/L
BACTERIA: NEGATIVE /HPF
BILIRUB SERPL-MCNC: 0.8 MG/DL
BILIRUBIN URINE: NEGATIVE
BLOOD URINE: NEGATIVE
BUN SERPL-MCNC: 14 MG/DL
CALCIUM SERPL-MCNC: 10.7 MG/DL
CAST: 0 /LPF
CHLORIDE SERPL-SCNC: 101 MMOL/L
CHOLEST SERPL-MCNC: 164 MG/DL
CO2 SERPL-SCNC: 27 MMOL/L
COLOR: YELLOW
CREAT SERPL-MCNC: 0.97 MG/DL
CRP SERPL-MCNC: <3 MG/L
EGFR: 113 ML/MIN/1.73M2
EPITHELIAL CELLS: 0 /HPF
ESTIMATED AVERAGE GLUCOSE: 105 MG/DL
FERRITIN SERPL-MCNC: 70 NG/ML
GLUCOSE QUALITATIVE U: NEGATIVE MG/DL
GLUCOSE SERPL-MCNC: 95 MG/DL
HBA1C MFR BLD HPLC: 5.3 %
HCT VFR BLD CALC: 47.8 %
HDLC SERPL-MCNC: 51 MG/DL
HGB BLD-MCNC: 15.9 G/DL
IRON SATN MFR SERPL: 20 %
IRON SERPL-MCNC: 65 UG/DL
KETONES URINE: NEGATIVE MG/DL
LDLC SERPL CALC-MCNC: 105 MG/DL
LEUKOCYTE ESTERASE URINE: NEGATIVE
MCHC RBC-ENTMCNC: 29.4 PG
MCHC RBC-ENTMCNC: 33.3 GM/DL
MCV RBC AUTO: 88.5 FL
MICROSCOPIC-UA: NORMAL
NITRITE URINE: NEGATIVE
NONHDLC SERPL-MCNC: 113 MG/DL
PH URINE: 6
PLATELET # BLD AUTO: 283 K/UL
POTASSIUM SERPL-SCNC: 5.2 MMOL/L
PROT SERPL-MCNC: 7.9 G/DL
PROTEIN URINE: NEGATIVE MG/DL
RBC # BLD: 5.4 M/UL
RBC # FLD: 12.6 %
RED BLOOD CELLS URINE: 0 /HPF
SODIUM SERPL-SCNC: 140 MMOL/L
SPECIFIC GRAVITY URINE: 1.02
T4 SERPL-MCNC: 11.8 UG/DL
TESTOST FREE SERPL-MCNC: 8.6 PG/ML
TESTOST SERPL-MCNC: 507 NG/DL
THYROGLOB AB SERPL-ACNC: <20 IU/ML
THYROPEROXIDASE AB SERPL IA-ACNC: <10 IU/ML
TIBC SERPL-MCNC: 331 UG/DL
TRIGL SERPL-MCNC: 38 MG/DL
TSH SERPL-ACNC: 2.14 UIU/ML
UIBC SERPL-MCNC: 267 UG/DL
UROBILINOGEN URINE: 0.2 MG/DL
WBC # FLD AUTO: 5.39 K/UL
WHITE BLOOD CELLS URINE: 0 /HPF

## 2024-05-30 ENCOUNTER — APPOINTMENT (OUTPATIENT)
Dept: INTERNAL MEDICINE | Facility: CLINIC | Age: 24
End: 2024-05-30
Payer: MEDICAID

## 2024-05-30 DIAGNOSIS — L65.9 NONSCARRING HAIR LOSS, UNSPECIFIED: ICD-10-CM

## 2024-05-30 DIAGNOSIS — R45.84 ANHEDONIA: ICD-10-CM

## 2024-05-30 DIAGNOSIS — N52.9 MALE ERECTILE DYSFUNCTION, UNSPECIFIED: ICD-10-CM

## 2024-05-30 DIAGNOSIS — M54.6 PAIN IN THORACIC SPINE: ICD-10-CM

## 2024-05-30 PROCEDURE — 99443: CPT

## 2024-06-13 ENCOUNTER — APPOINTMENT (OUTPATIENT)
Dept: INTERNAL MEDICINE | Facility: CLINIC | Age: 24
End: 2024-06-13

## 2024-06-13 ENCOUNTER — APPOINTMENT (OUTPATIENT)
Dept: GASTROENTEROLOGY | Facility: AMBULATORY MEDICAL SERVICES | Age: 24
End: 2024-06-13
Payer: MEDICAID

## 2024-06-13 PROCEDURE — 43239 EGD BIOPSY SINGLE/MULTIPLE: CPT

## 2024-06-19 ENCOUNTER — APPOINTMENT (OUTPATIENT)
Dept: INTERNAL MEDICINE | Facility: CLINIC | Age: 24
End: 2024-06-19

## 2024-06-26 ENCOUNTER — NON-APPOINTMENT (OUTPATIENT)
Age: 24
End: 2024-06-26

## 2024-06-26 ENCOUNTER — APPOINTMENT (OUTPATIENT)
Dept: INTERNAL MEDICINE | Facility: CLINIC | Age: 24
End: 2024-06-26

## 2024-06-26 VITALS
WEIGHT: 131 LBS | DIASTOLIC BLOOD PRESSURE: 80 MMHG | OXYGEN SATURATION: 99 % | HEART RATE: 84 BPM | SYSTOLIC BLOOD PRESSURE: 120 MMHG | TEMPERATURE: 97.7 F | BODY MASS INDEX: 18.54 KG/M2 | HEIGHT: 70.5 IN

## 2024-06-26 DIAGNOSIS — F50.9 EATING DISORDER, UNSPECIFIED: ICD-10-CM

## 2024-06-26 DIAGNOSIS — K21.9 GASTRO-ESOPHAGEAL REFLUX DISEASE W/OUT ESOPHAGITIS: ICD-10-CM

## 2024-06-26 DIAGNOSIS — R00.2 PALPITATIONS: ICD-10-CM

## 2024-06-26 DIAGNOSIS — E80.4 GILBERT SYNDROME: ICD-10-CM

## 2024-06-26 DIAGNOSIS — E55.9 VITAMIN D DEFICIENCY, UNSPECIFIED: ICD-10-CM

## 2024-06-26 PROCEDURE — 99214 OFFICE O/P EST MOD 30 MIN: CPT

## 2024-06-26 PROCEDURE — 93000 ELECTROCARDIOGRAM COMPLETE: CPT

## 2024-06-27 LAB
ALBUMIN SERPL ELPH-MCNC: 4.8 G/DL
ALP BLD-CCNC: 78 U/L
ALT SERPL-CCNC: 20 U/L
ANION GAP SERPL CALC-SCNC: 10 MMOL/L
AST SERPL-CCNC: 23 U/L
BILIRUB SERPL-MCNC: 1.4 MG/DL
BUN SERPL-MCNC: 12 MG/DL
CALCIUM SERPL-MCNC: 10 MG/DL
CALCIUM SERPL-MCNC: 10 MG/DL
CHLORIDE SERPL-SCNC: 102 MMOL/L
CHOLEST SERPL-MCNC: 146 MG/DL
CO2 SERPL-SCNC: 26 MMOL/L
CREAT SERPL-MCNC: 1.04 MG/DL
EGFR: 103 ML/MIN/1.73M2
ESTIMATED AVERAGE GLUCOSE: 105 MG/DL
FSH SERPL-MCNC: 2.1 IU/L
GLUCOSE SERPL-MCNC: 91 MG/DL
HBA1C MFR BLD HPLC: 5.3 %
HCT VFR BLD CALC: 45.1 %
HDLC SERPL-MCNC: 41 MG/DL
HGB BLD-MCNC: 15.5 G/DL
LDLC SERPL CALC-MCNC: 87 MG/DL
LH SERPL-ACNC: 7 IU/L
MCHC RBC-ENTMCNC: 29.6 PG
MCHC RBC-ENTMCNC: 34.4 GM/DL
MCV RBC AUTO: 86.2 FL
NONHDLC SERPL-MCNC: 105 MG/DL
PARATHYROID HORMONE INTACT: 24 PG/ML
PLATELET # BLD AUTO: 203 K/UL
POTASSIUM SERPL-SCNC: 4.6 MMOL/L
PROLACTIN SERPL-MCNC: 9.1 NG/ML
PROT SERPL-MCNC: 7.8 G/DL
RBC # BLD: 5.23 M/UL
RBC # FLD: 13.2 %
SHBG SERPL-SCNC: 27.8 NMOL/L
SODIUM SERPL-SCNC: 139 MMOL/L
TRIGL SERPL-MCNC: 98 MG/DL
TSH SERPL-ACNC: 4.39 UIU/ML
WBC # FLD AUTO: 6.2 K/UL

## 2024-06-28 LAB
TESTOST FREE SERPL-MCNC: 7.4 PG/ML
TESTOST SERPL-MCNC: 433 NG/DL

## 2024-07-10 ENCOUNTER — OUTPATIENT (OUTPATIENT)
Dept: OUTPATIENT SERVICES | Facility: HOSPITAL | Age: 24
LOS: 1 days | Discharge: STILL ADMITTED OR ADMINISTRATIVE | End: 2024-07-10
Payer: COMMERCIAL

## 2024-07-10 DIAGNOSIS — Z78.9 OTHER SPECIFIED HEALTH STATUS: Chronic | ICD-10-CM

## 2024-07-11 PROCEDURE — 90791 PSYCH DIAGNOSTIC EVALUATION: CPT

## 2024-07-12 ENCOUNTER — APPOINTMENT (OUTPATIENT)
Dept: MRI IMAGING | Facility: CLINIC | Age: 24
End: 2024-07-12
Payer: MEDICAID

## 2024-07-12 ENCOUNTER — OUTPATIENT (OUTPATIENT)
Dept: OUTPATIENT SERVICES | Facility: HOSPITAL | Age: 24
LOS: 1 days | End: 2024-07-12
Payer: MEDICAID

## 2024-07-12 DIAGNOSIS — Z78.9 OTHER SPECIFIED HEALTH STATUS: Chronic | ICD-10-CM

## 2024-07-12 DIAGNOSIS — Z00.8 ENCOUNTER FOR OTHER GENERAL EXAMINATION: ICD-10-CM

## 2024-07-12 PROCEDURE — 72148 MRI LUMBAR SPINE W/O DYE: CPT | Mod: 26

## 2024-07-12 PROCEDURE — 72141 MRI NECK SPINE W/O DYE: CPT | Mod: 26

## 2024-07-12 PROCEDURE — 72146 MRI CHEST SPINE W/O DYE: CPT | Mod: 26

## 2024-07-12 PROCEDURE — 72141 MRI NECK SPINE W/O DYE: CPT

## 2024-07-12 PROCEDURE — 72146 MRI CHEST SPINE W/O DYE: CPT

## 2024-07-12 PROCEDURE — 72148 MRI LUMBAR SPINE W/O DYE: CPT

## 2024-07-17 ENCOUNTER — APPOINTMENT (OUTPATIENT)
Dept: NEUROLOGY | Facility: CLINIC | Age: 24
End: 2024-07-17
Payer: MEDICAID

## 2024-07-17 VITALS
SYSTOLIC BLOOD PRESSURE: 102 MMHG | DIASTOLIC BLOOD PRESSURE: 67 MMHG | HEART RATE: 74 BPM | WEIGHT: 131 LBS | HEIGHT: 70 IN | BODY MASS INDEX: 18.75 KG/M2

## 2024-07-17 DIAGNOSIS — R51.9 HEADACHE, UNSPECIFIED: ICD-10-CM

## 2024-07-17 DIAGNOSIS — F41.9 ANXIETY DISORDER, UNSPECIFIED: ICD-10-CM

## 2024-07-17 DIAGNOSIS — F32.A ANXIETY DISORDER, UNSPECIFIED: ICD-10-CM

## 2024-07-17 PROCEDURE — 99213 OFFICE O/P EST LOW 20 MIN: CPT

## 2024-08-07 DIAGNOSIS — F43.21 ADJUSTMENT DISORDER WITH DEPRESSED MOOD: ICD-10-CM

## 2024-10-28 ENCOUNTER — TRANSCRIPTION ENCOUNTER (OUTPATIENT)
Age: 24
End: 2024-10-28

## 2024-11-21 ENCOUNTER — APPOINTMENT (OUTPATIENT)
Dept: INTERNAL MEDICINE | Facility: CLINIC | Age: 24
End: 2024-11-21
Payer: MEDICAID

## 2024-11-21 DIAGNOSIS — R53.81 OTHER MALAISE: ICD-10-CM

## 2024-11-21 DIAGNOSIS — R53.83 OTHER MALAISE: ICD-10-CM

## 2024-11-21 DIAGNOSIS — L65.9 NONSCARRING HAIR LOSS, UNSPECIFIED: ICD-10-CM

## 2024-11-21 PROCEDURE — 99442: CPT

## 2024-12-19 ENCOUNTER — APPOINTMENT (OUTPATIENT)
Dept: DERMATOLOGY | Facility: CLINIC | Age: 24
End: 2024-12-19
Payer: MEDICAID

## 2024-12-19 DIAGNOSIS — H02.719 CHLOASMA OF UNSPECIFIED EYE, UNSPECIFIED EYELID AND PERIOCULAR AREA: ICD-10-CM

## 2024-12-19 DIAGNOSIS — L70.9 ACNE, UNSPECIFIED: ICD-10-CM

## 2024-12-19 DIAGNOSIS — L21.9 SEBORRHEIC DERMATITIS, UNSPECIFIED: ICD-10-CM

## 2024-12-19 PROCEDURE — 99214 OFFICE O/P EST MOD 30 MIN: CPT

## 2024-12-19 RX ORDER — TRETINOIN 0.5 MG/G
0.05 CREAM TOPICAL
Qty: 1 | Refills: 11 | Status: ACTIVE | COMMUNITY
Start: 2024-12-19 | End: 1900-01-01

## 2025-02-03 ENCOUNTER — APPOINTMENT (OUTPATIENT)
Dept: ORTHOPEDIC SURGERY | Facility: CLINIC | Age: 25
End: 2025-02-03
Payer: MEDICAID

## 2025-02-03 VITALS — WEIGHT: 130 LBS | BODY MASS INDEX: 18.61 KG/M2 | HEIGHT: 70 IN

## 2025-02-03 DIAGNOSIS — M54.6 PAIN IN THORACIC SPINE: ICD-10-CM

## 2025-02-03 DIAGNOSIS — M54.2 CERVICALGIA: ICD-10-CM

## 2025-02-03 DIAGNOSIS — M54.50 LOW BACK PAIN, UNSPECIFIED: ICD-10-CM

## 2025-02-03 PROCEDURE — 99214 OFFICE O/P EST MOD 30 MIN: CPT

## 2025-03-25 ENCOUNTER — APPOINTMENT (OUTPATIENT)
Dept: ENDOCRINOLOGY | Facility: CLINIC | Age: 25
End: 2025-03-25
Payer: MEDICAID

## 2025-03-25 VITALS
DIASTOLIC BLOOD PRESSURE: 75 MMHG | SYSTOLIC BLOOD PRESSURE: 107 MMHG | HEART RATE: 69 BPM | BODY MASS INDEX: 18.04 KG/M2 | OXYGEN SATURATION: 99 % | HEIGHT: 70 IN | WEIGHT: 126 LBS

## 2025-03-25 DIAGNOSIS — R79.89 OTHER SPECIFIED ABNORMAL FINDINGS OF BLOOD CHEMISTRY: ICD-10-CM

## 2025-03-25 DIAGNOSIS — R53.82 CHRONIC FATIGUE, UNSPECIFIED: ICD-10-CM

## 2025-03-25 PROCEDURE — 99204 OFFICE O/P NEW MOD 45 MIN: CPT

## 2025-03-25 RX ORDER — BUPROPION HYDROCHLORIDE 150 MG/1
150 TABLET, FILM COATED ORAL
Refills: 0 | Status: ACTIVE | COMMUNITY

## 2025-03-25 RX ORDER — SERTRALINE HYDROCHLORIDE 100 MG/1
100 TABLET, FILM COATED ORAL
Refills: 0 | Status: ACTIVE | COMMUNITY

## 2025-04-03 LAB
C PEPTIDE SERPL-MCNC: 2 NG/ML
CORTIS SERPL-MCNC: 14.2 UG/DL
ESTIMATED AVERAGE GLUCOSE: 105 MG/DL
FERRITIN SERPL-MCNC: 148 NG/ML
FSH SERPL-MCNC: 2.6 IU/L
HBA1C MFR BLD HPLC: 5.3 %
LH SERPL-ACNC: 8.4 IU/L
MAGNESIUM SERPL-MCNC: 2.1 MG/DL
T3 SERPL-MCNC: 150 NG/DL
TSH SERPL-ACNC: 2.72 UIU/ML
VIT B12 SERPL-MCNC: 1099 PG/ML

## 2025-04-04 LAB
TESTOST FREE SERPL-MCNC: 4.3 PG/ML
TESTOST SERPL-MCNC: 629 NG/DL

## 2025-04-10 ENCOUNTER — APPOINTMENT (OUTPATIENT)
Dept: ENDOCRINOLOGY | Facility: CLINIC | Age: 25
End: 2025-04-10
Payer: MEDICAID

## 2025-04-10 DIAGNOSIS — R79.89 OTHER SPECIFIED ABNORMAL FINDINGS OF BLOOD CHEMISTRY: ICD-10-CM

## 2025-04-10 PROCEDURE — 99212 OFFICE O/P EST SF 10 MIN: CPT

## 2025-04-23 ENCOUNTER — APPOINTMENT (OUTPATIENT)
Dept: INTERNAL MEDICINE | Facility: CLINIC | Age: 25
End: 2025-04-23

## 2025-04-23 VITALS
TEMPERATURE: 97.7 F | HEART RATE: 88 BPM | DIASTOLIC BLOOD PRESSURE: 80 MMHG | RESPIRATION RATE: 17 BRPM | HEIGHT: 71 IN | WEIGHT: 127 LBS | BODY MASS INDEX: 17.78 KG/M2 | OXYGEN SATURATION: 97 % | SYSTOLIC BLOOD PRESSURE: 100 MMHG

## 2025-04-23 DIAGNOSIS — L60.8 OTHER NAIL DISORDERS: ICD-10-CM

## 2025-04-23 LAB
ALBUMIN SERPL ELPH-MCNC: 4.8 G/DL
ALP BLD-CCNC: 69 U/L
ALT SERPL-CCNC: 27 U/L
ANION GAP SERPL CALC-SCNC: 16 MMOL/L
AST SERPL-CCNC: 25 U/L
BILIRUB SERPL-MCNC: 1 MG/DL
BUN SERPL-MCNC: 22 MG/DL
CALCIUM SERPL-MCNC: 9.9 MG/DL
CHLORIDE SERPL-SCNC: 101 MMOL/L
CHOLEST SERPL-MCNC: 196 MG/DL
CO2 SERPL-SCNC: 25 MMOL/L
CREAT SERPL-MCNC: 1.23 MG/DL
EGFRCR SERPLBLD CKD-EPI 2021: 85 ML/MIN/1.73M2
ESTIMATED AVERAGE GLUCOSE: 103 MG/DL
GLUCOSE SERPL-MCNC: 86 MG/DL
HBA1C MFR BLD HPLC: 5.2 %
HCT VFR BLD CALC: 46.7 %
HDLC SERPL-MCNC: 47 MG/DL
HGB BLD-MCNC: 15.7 G/DL
LDLC SERPL-MCNC: 137 MG/DL
MCHC RBC-ENTMCNC: 29.3 PG
MCHC RBC-ENTMCNC: 33.6 G/DL
MCV RBC AUTO: 87.1 FL
NONHDLC SERPL-MCNC: 149 MG/DL
PLATELET # BLD AUTO: 255 K/UL
POTASSIUM SERPL-SCNC: 4.9 MMOL/L
PROT SERPL-MCNC: 7.8 G/DL
RBC # BLD: 5.36 M/UL
RBC # FLD: 13.1 %
SODIUM SERPL-SCNC: 141 MMOL/L
TRIGL SERPL-MCNC: 65 MG/DL
TSH SERPL-ACNC: 3.26 UIU/ML
WBC # FLD AUTO: 5.96 K/UL

## 2025-04-23 PROCEDURE — 99214 OFFICE O/P EST MOD 30 MIN: CPT | Mod: 25

## 2025-04-23 PROCEDURE — 99395 PREV VISIT EST AGE 18-39: CPT

## 2025-04-23 RX ORDER — FLUTICASONE PROPIONATE 50 UG/1
50 SPRAY, METERED NASAL DAILY
Qty: 1 | Refills: 1 | Status: ACTIVE | COMMUNITY
Start: 2025-04-23 | End: 1900-01-01

## 2025-05-08 ENCOUNTER — APPOINTMENT (OUTPATIENT)
Dept: UROLOGY | Facility: CLINIC | Age: 25
End: 2025-05-08
Payer: MEDICAID

## 2025-05-08 DIAGNOSIS — Z31.41 ENCOUNTER FOR FERTILITY TESTING: ICD-10-CM

## 2025-05-08 DIAGNOSIS — F52.32 MALE ORGASMIC DISORDER: ICD-10-CM

## 2025-05-08 PROCEDURE — G2211 COMPLEX E/M VISIT ADD ON: CPT | Mod: NC

## 2025-05-08 PROCEDURE — 99204 OFFICE O/P NEW MOD 45 MIN: CPT

## 2025-05-09 ENCOUNTER — APPOINTMENT (OUTPATIENT)
Dept: HUMAN REPRODUCTION | Facility: CLINIC | Age: 25
End: 2025-05-09

## 2025-05-15 ENCOUNTER — APPOINTMENT (OUTPATIENT)
Dept: INTERNAL MEDICINE | Facility: CLINIC | Age: 25
End: 2025-05-15

## 2025-05-15 PROCEDURE — 99215 OFFICE O/P EST HI 40 MIN: CPT | Mod: 95

## 2025-05-23 ENCOUNTER — RX RENEWAL (OUTPATIENT)
Age: 25
End: 2025-05-23

## 2025-07-28 NOTE — ED ADULT NURSE NOTE - NS ED NURSE PATIENT LEFT UNIT TIME
Colonoscopy Discharge Instructions  Activity:  Do not drive or operate heavy machinery for the next 24 hours. The medication you have received may impair your reflexes and coordination.    You may feel sleepy or groggy for the rest of the day.    Tomorrow, you can resume your full activity unless otherwise instructed by your doctor.    If a polyp has been removed, for the next seven days  Do not take long car trips  Do not do any heavy lifting, straining, or running  Do not take any aspirin, or anti-inflammatory medications such as Advil, Aleve, Motrin or Ibuprofen for 7 to 10 days.  If you are on a blood thinner, ask your doctor when you can resume it.    Diet:  Resume a regular diet.    Do not drink alcohol for 24 hours.     Special Instructions:  It is normal to have mild abdominal pain and bloating.     It is normal to have gas right after the test. It can help to try to pass it to help prevent later bloating.     You may notice a small amount of blood in your stool or on the tissue paper today.    Call your Gastroenterologist if you develop the following:  Severe abdominal pain or bloating  Chills or fever of 100 degrees or more  Blood from your rectum or in your stools measuring greater than 1/3 cup.         Created: 10/2020   Created by:  Lacho    Revised:  10/2020   
05:45

## 2025-08-07 ENCOUNTER — TRANSCRIPTION ENCOUNTER (OUTPATIENT)
Age: 25
End: 2025-08-07

## 2025-08-08 ENCOUNTER — TRANSCRIPTION ENCOUNTER (OUTPATIENT)
Age: 25
End: 2025-08-08

## 2025-08-09 ENCOUNTER — RX RENEWAL (OUTPATIENT)
Age: 25
End: 2025-08-09

## 2025-08-11 RX ORDER — TRETINOIN 1 MG/G
0.1 CREAM TOPICAL
Qty: 1 | Refills: 3 | Status: ACTIVE | COMMUNITY
Start: 2025-08-11 | End: 1900-01-01

## 2025-08-12 ENCOUNTER — TRANSCRIPTION ENCOUNTER (OUTPATIENT)
Age: 25
End: 2025-08-12

## 2025-08-13 ENCOUNTER — TRANSCRIPTION ENCOUNTER (OUTPATIENT)
Age: 25
End: 2025-08-13

## 2025-09-12 DIAGNOSIS — Z23 ENCOUNTER FOR IMMUNIZATION: ICD-10-CM

## 2025-09-18 ENCOUNTER — APPOINTMENT (OUTPATIENT)
Dept: INTERNAL MEDICINE | Facility: CLINIC | Age: 25
End: 2025-09-18
Payer: MEDICAID

## 2025-09-18 PROCEDURE — 90707 MMR VACCINE SC: CPT

## 2025-09-18 PROCEDURE — 90471 IMMUNIZATION ADMIN: CPT
